# Patient Record
Sex: MALE | Race: WHITE | NOT HISPANIC OR LATINO | URBAN - METROPOLITAN AREA
[De-identification: names, ages, dates, MRNs, and addresses within clinical notes are randomized per-mention and may not be internally consistent; named-entity substitution may affect disease eponyms.]

---

## 2021-02-26 ENCOUNTER — INPATIENT (INPATIENT)
Facility: HOSPITAL | Age: 71
LOS: 9 days | Discharge: HOME | End: 2021-03-08
Attending: STUDENT IN AN ORGANIZED HEALTH CARE EDUCATION/TRAINING PROGRAM | Admitting: STUDENT IN AN ORGANIZED HEALTH CARE EDUCATION/TRAINING PROGRAM
Payer: MEDICARE

## 2021-02-26 VITALS
RESPIRATION RATE: 24 BRPM | TEMPERATURE: 98 F | DIASTOLIC BLOOD PRESSURE: 86 MMHG | HEART RATE: 165 BPM | OXYGEN SATURATION: 86 % | HEIGHT: 73 IN | WEIGHT: 199.96 LBS | SYSTOLIC BLOOD PRESSURE: 144 MMHG

## 2021-02-26 PROCEDURE — 99291 CRITICAL CARE FIRST HOUR: CPT | Mod: CS

## 2021-02-26 NOTE — ED ADULT TRIAGE NOTE - CHIEF COMPLAINT QUOTE
Pt BIBA from home c/o weakness inability to ambulate today, flu like symptoms x1 week, SOB, chills/body aches. Pt tachycardic and hypoxic when EMS arrived and placed pt on O2. Pt labored breathing in triage, O2 Sat 82-88% and tachycardic. Pt denies chest pain. Pt brought direct to crit iso room

## 2021-02-27 LAB
ALBUMIN SERPL ELPH-MCNC: 4.1 G/DL — SIGNIFICANT CHANGE UP (ref 3.5–5.2)
ALP SERPL-CCNC: 64 U/L — SIGNIFICANT CHANGE UP (ref 30–115)
ALT FLD-CCNC: 25 U/L — SIGNIFICANT CHANGE UP (ref 0–41)
ANION GAP SERPL CALC-SCNC: 17 MMOL/L — HIGH (ref 7–14)
APPEARANCE UR: CLEAR — SIGNIFICANT CHANGE UP
APTT BLD: 30.9 SEC — SIGNIFICANT CHANGE UP (ref 27–39.2)
AST SERPL-CCNC: 54 U/L — HIGH (ref 0–41)
BACTERIA # UR AUTO: NEGATIVE — SIGNIFICANT CHANGE UP
BASE EXCESS BLDV CALC-SCNC: -5.9 MMOL/L — LOW (ref -2–2)
BASOPHILS # BLD AUTO: 0.02 K/UL — SIGNIFICANT CHANGE UP (ref 0–0.2)
BASOPHILS NFR BLD AUTO: 0.2 % — SIGNIFICANT CHANGE UP (ref 0–1)
BILIRUB SERPL-MCNC: 0.6 MG/DL — SIGNIFICANT CHANGE UP (ref 0.2–1.2)
BILIRUB UR-MCNC: NEGATIVE — SIGNIFICANT CHANGE UP
BUN SERPL-MCNC: 93 MG/DL — CRITICAL HIGH (ref 10–20)
CA-I SERPL-SCNC: 1.11 MMOL/L — LOW (ref 1.12–1.3)
CALCIUM SERPL-MCNC: 8.8 MG/DL — SIGNIFICANT CHANGE UP (ref 8.5–10.1)
CHLORIDE SERPL-SCNC: 101 MMOL/L — SIGNIFICANT CHANGE UP (ref 98–110)
CK MB CFR SERPL CALC: 2 NG/ML — SIGNIFICANT CHANGE UP (ref 0.6–6.3)
CK SERPL-CCNC: 432 U/L — HIGH (ref 0–225)
CO2 SERPL-SCNC: 19 MMOL/L — SIGNIFICANT CHANGE UP (ref 17–32)
COLOR SPEC: YELLOW — SIGNIFICANT CHANGE UP
CREAT SERPL-MCNC: 2.6 MG/DL — HIGH (ref 0.7–1.5)
CRP SERPL-MCNC: 3.41 MG/DL — HIGH (ref 0–0.4)
D DIMER BLD IA.RAPID-MCNC: 2793 NG/ML DDU — HIGH (ref 0–230)
DIFF PNL FLD: ABNORMAL
EOSINOPHIL # BLD AUTO: 0 K/UL — SIGNIFICANT CHANGE UP (ref 0–0.7)
EOSINOPHIL NFR BLD AUTO: 0 % — SIGNIFICANT CHANGE UP (ref 0–8)
EPI CELLS # UR: 2 /HPF — SIGNIFICANT CHANGE UP (ref 0–5)
ETHANOL SERPL-MCNC: <10 MG/DL — SIGNIFICANT CHANGE UP
FERRITIN SERPL-MCNC: 3177 NG/ML — HIGH (ref 30–400)
GAS PNL BLDV: 142 MMOL/L — SIGNIFICANT CHANGE UP (ref 136–145)
GAS PNL BLDV: SIGNIFICANT CHANGE UP
GAS PNL BLDV: SIGNIFICANT CHANGE UP
GLUCOSE BLDC GLUCOMTR-MCNC: 129 MG/DL — HIGH (ref 70–99)
GLUCOSE BLDC GLUCOMTR-MCNC: 147 MG/DL — HIGH (ref 70–99)
GLUCOSE BLDC GLUCOMTR-MCNC: 149 MG/DL — HIGH (ref 70–99)
GLUCOSE BLDC GLUCOMTR-MCNC: 359 MG/DL — HIGH (ref 70–99)
GLUCOSE SERPL-MCNC: 166 MG/DL — HIGH (ref 70–99)
GLUCOSE UR QL: NEGATIVE — SIGNIFICANT CHANGE UP
HCO3 BLDV-SCNC: 21 MMOL/L — LOW (ref 22–29)
HCT VFR BLD CALC: 42.6 % — SIGNIFICANT CHANGE UP (ref 42–52)
HCT VFR BLDA CALC: 45.3 % — HIGH (ref 34–44)
HGB BLD CALC-MCNC: 14.8 G/DL — SIGNIFICANT CHANGE UP (ref 14–18)
HGB BLD-MCNC: 14.6 G/DL — SIGNIFICANT CHANGE UP (ref 14–18)
HYALINE CASTS # UR AUTO: 4 /LPF — SIGNIFICANT CHANGE UP (ref 0–7)
IMM GRANULOCYTES NFR BLD AUTO: 0.4 % — HIGH (ref 0.1–0.3)
INR BLD: 1.1 RATIO — SIGNIFICANT CHANGE UP (ref 0.65–1.3)
KETONES UR-MCNC: SIGNIFICANT CHANGE UP
LACTATE BLDV-MCNC: 3.3 MMOL/L — HIGH (ref 0.5–1.6)
LEUKOCYTE ESTERASE UR-ACNC: NEGATIVE — SIGNIFICANT CHANGE UP
LYMPHOCYTES # BLD AUTO: 0.78 K/UL — LOW (ref 1.2–3.4)
LYMPHOCYTES # BLD AUTO: 8.4 % — LOW (ref 20.5–51.1)
MCHC RBC-ENTMCNC: 30.4 PG — SIGNIFICANT CHANGE UP (ref 27–31)
MCHC RBC-ENTMCNC: 34.3 G/DL — SIGNIFICANT CHANGE UP (ref 32–37)
MCV RBC AUTO: 88.8 FL — SIGNIFICANT CHANGE UP (ref 80–94)
MONOCYTES # BLD AUTO: 0.39 K/UL — SIGNIFICANT CHANGE UP (ref 0.1–0.6)
MONOCYTES NFR BLD AUTO: 4.2 % — SIGNIFICANT CHANGE UP (ref 1.7–9.3)
NEUTROPHILS # BLD AUTO: 8.02 K/UL — HIGH (ref 1.4–6.5)
NEUTROPHILS NFR BLD AUTO: 86.8 % — HIGH (ref 42.2–75.2)
NITRITE UR-MCNC: NEGATIVE — SIGNIFICANT CHANGE UP
NRBC # BLD: 0 /100 WBCS — SIGNIFICANT CHANGE UP (ref 0–0)
NT-PROBNP SERPL-SCNC: 654 PG/ML — HIGH (ref 0–300)
PCO2 BLDV: 44 MMHG — SIGNIFICANT CHANGE UP (ref 41–51)
PH BLDV: 7.28 — SIGNIFICANT CHANGE UP (ref 7.26–7.43)
PH UR: 6 — SIGNIFICANT CHANGE UP (ref 5–8)
PLATELET # BLD AUTO: 232 K/UL — SIGNIFICANT CHANGE UP (ref 130–400)
PO2 BLDV: 15 MMHG — LOW (ref 20–40)
POTASSIUM BLDV-SCNC: 4.8 MMOL/L — SIGNIFICANT CHANGE UP (ref 3.3–5.6)
POTASSIUM SERPL-MCNC: 4.9 MMOL/L — SIGNIFICANT CHANGE UP (ref 3.5–5)
POTASSIUM SERPL-SCNC: 4.9 MMOL/L — SIGNIFICANT CHANGE UP (ref 3.5–5)
PROCALCITONIN SERPL-MCNC: 0.5 NG/ML — HIGH (ref 0.02–0.1)
PROT SERPL-MCNC: 7.6 G/DL — SIGNIFICANT CHANGE UP (ref 6–8)
PROT UR-MCNC: ABNORMAL
PROTHROM AB SERPL-ACNC: 12.7 SEC — SIGNIFICANT CHANGE UP (ref 9.95–12.87)
RAPID RVP RESULT: DETECTED
RBC # BLD: 4.8 M/UL — SIGNIFICANT CHANGE UP (ref 4.7–6.1)
RBC # FLD: 14.3 % — SIGNIFICANT CHANGE UP (ref 11.5–14.5)
RBC CASTS # UR COMP ASSIST: 2 /HPF — SIGNIFICANT CHANGE UP (ref 0–4)
SAO2 % BLDV: 15 % — SIGNIFICANT CHANGE UP
SARS-COV-2 RNA SPEC QL NAA+PROBE: DETECTED
SODIUM SERPL-SCNC: 137 MMOL/L — SIGNIFICANT CHANGE UP (ref 135–146)
SP GR SPEC: 1.02 — SIGNIFICANT CHANGE UP (ref 1.01–1.03)
TROPONIN T SERPL-MCNC: 0.02 NG/ML — HIGH
TROPONIN T SERPL-MCNC: <0.01 NG/ML — SIGNIFICANT CHANGE UP
UROBILINOGEN FLD QL: SIGNIFICANT CHANGE UP
WBC # BLD: 9.25 K/UL — SIGNIFICANT CHANGE UP (ref 4.8–10.8)
WBC # FLD AUTO: 9.25 K/UL — SIGNIFICANT CHANGE UP (ref 4.8–10.8)
WBC UR QL: 6 /HPF — HIGH (ref 0–5)

## 2021-02-27 PROCEDURE — 70450 CT HEAD/BRAIN W/O DYE: CPT | Mod: 26

## 2021-02-27 PROCEDURE — 93970 EXTREMITY STUDY: CPT | Mod: 26

## 2021-02-27 PROCEDURE — 71045 X-RAY EXAM CHEST 1 VIEW: CPT | Mod: 26

## 2021-02-27 PROCEDURE — 76770 US EXAM ABDO BACK WALL COMP: CPT | Mod: 26

## 2021-02-27 PROCEDURE — 99223 1ST HOSP IP/OBS HIGH 75: CPT | Mod: CS

## 2021-02-27 PROCEDURE — 93010 ELECTROCARDIOGRAM REPORT: CPT

## 2021-02-27 PROCEDURE — 93010 ELECTROCARDIOGRAM REPORT: CPT | Mod: 77

## 2021-02-27 RX ORDER — INSULIN LISPRO 100/ML
VIAL (ML) SUBCUTANEOUS
Refills: 0 | Status: DISCONTINUED | OUTPATIENT
Start: 2021-02-27 | End: 2021-03-08

## 2021-02-27 RX ORDER — INSULIN GLARGINE 100 [IU]/ML
18 INJECTION, SOLUTION SUBCUTANEOUS AT BEDTIME
Refills: 0 | Status: DISCONTINUED | OUTPATIENT
Start: 2021-02-27 | End: 2021-02-27

## 2021-02-27 RX ORDER — DEXTROSE 50 % IN WATER 50 %
25 SYRINGE (ML) INTRAVENOUS ONCE
Refills: 0 | Status: DISCONTINUED | OUTPATIENT
Start: 2021-02-27 | End: 2021-03-08

## 2021-02-27 RX ORDER — SODIUM CHLORIDE 9 MG/ML
1000 INJECTION, SOLUTION INTRAVENOUS
Refills: 0 | Status: DISCONTINUED | OUTPATIENT
Start: 2021-02-27 | End: 2021-03-08

## 2021-02-27 RX ORDER — ACETAMINOPHEN 500 MG
650 TABLET ORAL EVERY 6 HOURS
Refills: 0 | Status: DISCONTINUED | OUTPATIENT
Start: 2021-02-27 | End: 2021-03-08

## 2021-02-27 RX ORDER — PANTOPRAZOLE SODIUM 20 MG/1
40 TABLET, DELAYED RELEASE ORAL
Refills: 0 | Status: DISCONTINUED | OUTPATIENT
Start: 2021-02-27 | End: 2021-03-08

## 2021-02-27 RX ORDER — CEFEPIME 1 G/1
2000 INJECTION, POWDER, FOR SOLUTION INTRAMUSCULAR; INTRAVENOUS EVERY 12 HOURS
Refills: 0 | Status: DISCONTINUED | OUTPATIENT
Start: 2021-02-27 | End: 2021-02-27

## 2021-02-27 RX ORDER — DEXAMETHASONE 0.5 MG/5ML
6 ELIXIR ORAL DAILY
Refills: 0 | Status: DISCONTINUED | OUTPATIENT
Start: 2021-02-27 | End: 2021-03-08

## 2021-02-27 RX ORDER — CEFEPIME 1 G/1
1000 INJECTION, POWDER, FOR SOLUTION INTRAMUSCULAR; INTRAVENOUS ONCE
Refills: 0 | Status: COMPLETED | OUTPATIENT
Start: 2021-02-27 | End: 2021-02-27

## 2021-02-27 RX ORDER — DEXAMETHASONE 0.5 MG/5ML
6 ELIXIR ORAL ONCE
Refills: 0 | Status: COMPLETED | OUTPATIENT
Start: 2021-02-27 | End: 2021-02-27

## 2021-02-27 RX ORDER — GLUCAGON INJECTION, SOLUTION 0.5 MG/.1ML
1 INJECTION, SOLUTION SUBCUTANEOUS ONCE
Refills: 0 | Status: DISCONTINUED | OUTPATIENT
Start: 2021-02-27 | End: 2021-03-08

## 2021-02-27 RX ORDER — DEXTROSE 50 % IN WATER 50 %
15 SYRINGE (ML) INTRAVENOUS ONCE
Refills: 0 | Status: DISCONTINUED | OUTPATIENT
Start: 2021-02-27 | End: 2021-03-08

## 2021-02-27 RX ORDER — INSULIN LISPRO 100/ML
6 VIAL (ML) SUBCUTANEOUS
Refills: 0 | Status: DISCONTINUED | OUTPATIENT
Start: 2021-02-27 | End: 2021-03-08

## 2021-02-27 RX ORDER — SODIUM CHLORIDE 9 MG/ML
2000 INJECTION, SOLUTION INTRAVENOUS ONCE
Refills: 0 | Status: COMPLETED | OUTPATIENT
Start: 2021-02-27 | End: 2021-02-27

## 2021-02-27 RX ORDER — CEFEPIME 1 G/1
INJECTION, POWDER, FOR SOLUTION INTRAMUSCULAR; INTRAVENOUS
Refills: 0 | Status: DISCONTINUED | OUTPATIENT
Start: 2021-02-27 | End: 2021-03-01

## 2021-02-27 RX ORDER — INSULIN GLARGINE 100 [IU]/ML
18 INJECTION, SOLUTION SUBCUTANEOUS AT BEDTIME
Refills: 0 | Status: DISCONTINUED | OUTPATIENT
Start: 2021-02-27 | End: 2021-03-08

## 2021-02-27 RX ORDER — SODIUM CHLORIDE 9 MG/ML
1000 INJECTION, SOLUTION INTRAVENOUS
Refills: 0 | Status: DISCONTINUED | OUTPATIENT
Start: 2021-02-27 | End: 2021-03-02

## 2021-02-27 RX ORDER — HEPARIN SODIUM 5000 [USP'U]/ML
5000 INJECTION INTRAVENOUS; SUBCUTANEOUS EVERY 8 HOURS
Refills: 0 | Status: DISCONTINUED | OUTPATIENT
Start: 2021-02-27 | End: 2021-03-08

## 2021-02-27 RX ORDER — DEXTROSE 50 % IN WATER 50 %
12.5 SYRINGE (ML) INTRAVENOUS ONCE
Refills: 0 | Status: DISCONTINUED | OUTPATIENT
Start: 2021-02-27 | End: 2021-03-08

## 2021-02-27 RX ORDER — VANCOMYCIN HCL 1 G
1000 VIAL (EA) INTRAVENOUS ONCE
Refills: 0 | Status: COMPLETED | OUTPATIENT
Start: 2021-02-27 | End: 2021-02-27

## 2021-02-27 RX ORDER — CEFEPIME 1 G/1
1000 INJECTION, POWDER, FOR SOLUTION INTRAMUSCULAR; INTRAVENOUS EVERY 12 HOURS
Refills: 0 | Status: DISCONTINUED | OUTPATIENT
Start: 2021-02-28 | End: 2021-03-01

## 2021-02-27 RX ADMIN — PANTOPRAZOLE SODIUM 40 MILLIGRAM(S): 20 TABLET, DELAYED RELEASE ORAL at 08:13

## 2021-02-27 RX ADMIN — Medication 6 UNIT(S): at 17:08

## 2021-02-27 RX ADMIN — SODIUM CHLORIDE 60 MILLILITER(S): 9 INJECTION, SOLUTION INTRAVENOUS at 17:41

## 2021-02-27 RX ADMIN — Medication 6 UNIT(S): at 12:04

## 2021-02-27 RX ADMIN — Medication 6 MILLIGRAM(S): at 05:39

## 2021-02-27 RX ADMIN — HEPARIN SODIUM 5000 UNIT(S): 5000 INJECTION INTRAVENOUS; SUBCUTANEOUS at 17:08

## 2021-02-27 RX ADMIN — CEFEPIME 100 MILLIGRAM(S): 1 INJECTION, POWDER, FOR SOLUTION INTRAMUSCULAR; INTRAVENOUS at 16:53

## 2021-02-27 RX ADMIN — INSULIN GLARGINE 18 UNIT(S): 100 INJECTION, SOLUTION SUBCUTANEOUS at 21:12

## 2021-02-27 RX ADMIN — Medication 6 MILLIGRAM(S): at 11:16

## 2021-02-27 RX ADMIN — HEPARIN SODIUM 5000 UNIT(S): 5000 INJECTION INTRAVENOUS; SUBCUTANEOUS at 20:39

## 2021-02-27 RX ADMIN — CEFEPIME 100 MILLIGRAM(S): 1 INJECTION, POWDER, FOR SOLUTION INTRAMUSCULAR; INTRAVENOUS at 00:19

## 2021-02-27 RX ADMIN — Medication 5: at 12:05

## 2021-02-27 RX ADMIN — Medication 250 MILLIGRAM(S): at 01:14

## 2021-02-27 RX ADMIN — Medication 6 UNIT(S): at 08:12

## 2021-02-27 RX ADMIN — SODIUM CHLORIDE 2000 MILLILITER(S): 9 INJECTION, SOLUTION INTRAVENOUS at 00:19

## 2021-02-27 NOTE — ED PROVIDER NOTE - OBJECTIVE STATEMENT
69 y/o M PMHx DM, COPD not on home O2, presents to ED with generalized weakness and SOB x1 week. Pt reports mild substernal chest pain as well. Denies abdominal pain and vomiting. Pt tachycardic, hypoxic and febrile in triage. 71 y/o M PMHx DM, COPD not on home O2, presents to ED with generalized weakness and SOB x1 week. Pt reports mild substernal chest pain as well. Denies abdominal pain and vomiting. Pt tachycardic, hypoxic in high 80s and febrile in triage.

## 2021-02-27 NOTE — ED PROVIDER NOTE - CARE PLAN
Principal Discharge DX:	Weakness  Secondary Diagnosis:	SIMON (acute kidney injury)   Principal Discharge DX:	COVID-19  Secondary Diagnosis:	SIMON (acute kidney injury)   Principal Discharge DX:	COVID-19  Secondary Diagnosis:	SIMON (acute kidney injury)  Secondary Diagnosis:	Elevated troponin  Secondary Diagnosis:	Chest pain

## 2021-02-27 NOTE — ED PROVIDER NOTE - PROGRESS NOTE DETAILS
DL - Per patients wife, pt has been increasingly confused, having worsening tremors and generalized weakness over past week. Also reports pt has had urinary incontinence. No hx of alcohol abuse or Parkinson's dx. Pt s/o to me by Dr. Lala to follow up imaging, labs, reassess and dispo.

## 2021-02-27 NOTE — H&P ADULT - ATTENDING COMMENTS
Patient seen and examined independently. Agree with resident note/ history / physical exam and plan of care with following exceptions/additions/updates. Case discussed with patient/pt decision maker, house-staff and nursing.     above notes edited

## 2021-02-27 NOTE — H&P ADULT - ASSESSMENT
================= ASSESSMENT/PLAN ==================  Patient is a 70y old Male who presents with a chief complaint of Currently admitted to medicine with the primary diagnosis of COVID-19    # Acute hypoxic respiratory failure likely secondary to COVID 19 pneumonia   # Possible underlying RLL pneumonia   # Possible underlying PE  COVID PCR positive   X ray not impressive for COVID 19. Possible pneumonia (RLL opacity? although procal is low and no WBC) vs possible PE (d-dimer 3000)   Sepsis present on admission   PLAN  - Start decadron   - ID evaluation for further treatment options   - Unable to get CTPE given creatinine. Instead STAT duplex LE extremities. Heparin SQ for now   - Will continue cefepime for now renally dosed   - Repeat x ray and procal in AM  - TTE to rule out right heart strain     # SIMON vs CKD   - Send urine studies   - Gentle hydration   - Monitor BMP     # COPD not on home O2 - No evidence of exacerbation   - Get ABG   - Decadron for now   - Continue nebs     # Diabetes - BS uncontrolled   - Start basal bolus insulin   - Give stat lantus  - May need further adjustment with decadron     GI PPX: Pantoprazole   DVT PPX: Heparin SQ    DIET: DASH,carb, renal  ACTIVITY:  Advanced as tolerated     ================= DISPOSITION ==================    Patient to be discharged when condition(s) optimized.            Discharge to: Home when cleared             Home services:              Counseled on/Discussed cessation of:  () Risky behaviors  /  () Smoking cessation  /  () Diet  /  () Exercise  /  () Other    ================= CODE STATUS =================                  (X) FULL CODE     |     () DNR     |     () DNI    () Discussion with patient and/or family regarding goals of care       ================= ASSESSMENT/PLAN ==================  Patient is a 70y old Male who presents with a chief complaint of Currently admitted to medicine with the primary diagnosis of COVID-19    # AMS , worse than baseline, pt has confusion at base, now worse, probably due to hypoxemia and poss viral infection, monitor, consider neuro eval if not imprvoing , tremor is not new.   # Acute hypoxic respiratory failure likely secondary to COVID 19 pneumonia   # Possible underlying RLL pneumonia   # Possible underlying PE  COVID PCR positive   X ray not impressive for COVID 19. Possible pneumonia (RLL opacity? although procal is low and no WBC) vs possible PE (d-dimer 3000)   Sepsis present on admission   PLAN  - Start decadron   - ID evaluation for further treatment options   - Unable to get CTPE given creatinine. Instead STAT duplex LE extremities. Heparin SQ for now   - Will continue cefepime for now renally dosed   - Repeat x ray and procal in AM  - TTE to rule out right heart strain     # SIMON vs CKD , unknown baseline  - Send urine studies   - Gentle hydration   - Monitor BMP   -check bladder and kidney us, if has urinary retention/ or hydronephrosis botello or intermittenet cath     # COPD not on home O2 - No evidence of exacerbation   - Get ABG   - Decadron for now   - Continue nebs     # Diabetes - BS uncontrolled   - Start basal bolus insulin   - Give stat lantus  - May need further adjustment with decadron     GI PPX: Pantoprazole   DVT PPX: Heparin SQ    DIET: DASH,carb, renal  ACTIVITY:  Advanced as tolerated     ================= DISPOSITION ==================    Patient to be discharged when condition(s) optimized.            Discharge to: Home when cleared             Home services:              Counseled on/Discussed cessation of:  () Risky behaviors  /  () Smoking cessation  /  () Diet  /  () Exercise  /  () Other    ================= CODE STATUS =================                  (X) FULL CODE     |     () DNR     |     () DNI    () Discussion with patient and/or family regarding goals of care       ================= ASSESSMENT/PLAN ==================  Patient is a 70y old Male who presents with a chief complaint of Currently admitted to medicine with the primary diagnosis of COVID-19    # AMS , worse than baseline, pt has confusion at base, now worse, probably due to hypoxemia and poss viral infection, monitor, consider neuro eval if not imprvoing , tremor is not new.   # Acute hypoxic respiratory failure likely secondary to COVID 19 pneumonia   # Possible underlying RLL pneumonia   # Possible underlying PE  COVID PCR positive   X ray not impressive for COVID 19. Possible pneumonia (RLL opacity? although procal is low and no WBC) vs possible PE (d-dimer 3000)   Sepsis present on admission   PLAN  - Start decadron   - ID evaluation for further treatment options   - Unable to get CTPE given creatinine. Instead STAT duplex LE extremities. Heparin SQ for now   - Will continue cefepime for now renally dosed   - Repeat x ray and procal in AM  - TTE to rule out right heart strain     # SIMON vs CKD , unknown baseline  Attempted to call Dr. Tiana Ferrer for latest lab results but office currently closed   - Send urine studies   - Gentle hydration   - Monitor BMP   -check bladder and kidney us, if has urinary retention/ or hydronephrosis botello or intermittenet cath   - Hold Enalapril for now     # Elevated troponins - 0.02  Unlikely ACS. Likely due to kidney function   - Trend   - TTE     # COPD not on home O2 - No evidence of exacerbation   Only on albuterol at home. He uses it infrequently   - Get ABG   - Decadron for now   - Continue nebs     # Diabetes - BS uncontrolled   - Start basal bolus insulin   - Give stat lantus  - May need further adjustment with decadron     # Essential tremor   - Outpatient neurology follow up     GI PPX: Pantoprazole   DVT PPX: Heparin SQ    DIET: DASH,carb, renal  ACTIVITY:  Advanced as tolerated     Plan discussed with wife. Questions answered     ================= DISPOSITION ==================    Patient to be discharged when condition(s) optimized.            Discharge to: Home when cleared             Home services:              Counseled on/Discussed cessation of:  () Risky behaviors  /  () Smoking cessation  /  () Diet  /  () Exercise  /  () Other    ================= CODE STATUS =================                  () FULL CODE     |     (X DNR     |     (X DNI    () Discussion with patient and/or family regarding goals of care

## 2021-02-27 NOTE — H&P ADULT - NSHPLABSRESULTS_GEN_ALL_CORE
===================== LABS =====================                        14.6   9.25  )-----------( 232      ( 2021 00:31 )             42.6         137  |  101  |  93<HH>  ----------------------------<  166<H>  4.9   |  19  |  2.6<H>    Ca    8.8      2021 00:31    TPro  7.6  /  Alb  4.1  /  TBili  0.6  /  DBili  x   /  AST  54<H>  /  ALT  25  /  AlkPhos  64      PT/INR - ( 2021 00:31 )   PT: 12.70 sec;   INR: 1.10 ratio         PTT - ( 2021 00:31 )  PTT:30.9 sec  Urinalysis Basic - ( 2021 02:30 )    Color: Yellow / Appearance: Clear / S.021 / pH: x  Gluc: x / Ketone: Trace  / Bili: Negative / Urobili: <2 mg/dL   Blood: x / Protein: 100 mg/dL / Nitrite: Negative   Leuk Esterase: Negative / RBC: 2 /HPF / WBC 6 /HPF   Sq Epi: x / Non Sq Epi: 2 /HPF / Bacteria: Negative        Troponin T, Serum: 0.02 ng/mL <H> (21 @ 00:31)    CARDIAC MARKERS ( 2021 00:31 )  x     / 0.02 ng/mL / x     / x     / x          ================= MICROBIOLOGY ================    ================== IMAGING ==================    ================== OTHER SIGNIFICANT FINDINGS ==================    ================== PROCEDURES ==================    ================== EKG ================== ===================== LABS =====================                        14.6   9.25  )-----------( 232      ( 2021 00:31 )             42.6         137  |  101  |  93<HH>  ----------------------------<  166<H>  4.9   |  19  |  2.6<H>    Ca    8.8      2021 00:31    TPro  7.6  /  Alb  4.1  /  TBili  0.6  /  DBili  x   /  AST  54<H>  /  ALT  25  /  AlkPhos  64      PT/INR - ( 2021 00:31 )   PT: 12.70 sec;   INR: 1.10 ratio         PTT - ( 2021 00:31 )  PTT:30.9 sec  Urinalysis Basic - ( 2021 02:30 )    Color: Yellow / Appearance: Clear / S.021 / pH: x  Gluc: x / Ketone: Trace  / Bili: Negative / Urobili: <2 mg/dL   Blood: x / Protein: 100 mg/dL / Nitrite: Negative   Leuk Esterase: Negative / RBC: 2 /HPF / WBC 6 /HPF   Sq Epi: x / Non Sq Epi: 2 /HPF / Bacteria: Negative    Troponin T, Serum: 0.02 ng/mL <H> (21 @ 00:31)    CARDIAC MARKERS ( 2021 00:31 )  x     / 0.02 ng/mL / x     / x     / x      ================== IMAGING ==================    < from: CT Head No Cont (21 @ 04:58) >    No CT evidence of acute intracranial pathology.    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    ================== EKG ==================    < from: 12 Lead ECG (21 @ 00:21) >    Diagnosis Line Sinus tachycardia with Premature atrialcomplexes episodes of SVT  Possible Inferior infarct , age undetermined  Abnormal ECG

## 2021-02-27 NOTE — ED PROVIDER NOTE - PHYSICAL EXAMINATION
CONSTITUTIONAL: Well-developed; well-nourished; in no acute distress. Satting >95%  on NC. +diffuse tremor   SKIN: warm, dry  HEAD: Normocephalic; atraumatic.  EYES: no conjunctival injection. EOMI.   ENT: Dry MM   NECK: Supple; non tender.  CARD: S1, S2 normal; +tachycardic   RESP: No wheezes, rales or rhonchi.  ABD: soft ntnd.    EXT: Normal ROM. No LE edema.   LYMPH: No acute cervical adenopathy.  NEURO: Alert, oriented, grossly unremarkable. No FND.   PSYCH: Cooperative, appropriate.

## 2021-02-27 NOTE — H&P ADULT - HISTORY OF PRESENT ILLNESS
[70 year old man]    CC: Hypoxia, confusion    PMH: DM, COPD not on home oxygen,     History of Present Illness goes back to 1 week prior to presentation when the patient developed shortness of breath with generalized weakness. His wife also endorsed worsening confusion over the past few days. He also endorsed intermittent occasional chest pain, substernal, nonradiating.      In the ED, BP was 144/86, HR was 165 (sinus), SpO2 was 86% on room air, temp was 101.9.  [70 year old man]    CC: Hypoxia, confusion    PMH: DM, COPD not on home oxygen,     History of Present Illness goes back to 1 week prior to presentation when the patient developed shortness of breath with generalized weakness. The weakness was so severe the patient was unable to get out of bed or walk. He made it alone to he rest room once but legs gave out and the patient fell in the bathtub with head trauma but no syncope. He also was complaining of severe dizziness and lightheadedness.   His wife also endorsed worsening confusion over the past few days. He also endorsed intermittent occasional chest pain, substernal, nonradiating.      The patient is normally fully functional and independent. He does not have confusion at baseline     In the ED, BP was 144/86, HR was 165 (sinus), SpO2 was 86% on room air, temp was 101.9.

## 2021-02-27 NOTE — ED PROVIDER NOTE - CLINICAL SUMMARY MEDICAL DECISION MAKING FREE TEXT BOX
69 yo male presented c/o weakness and SOB, improved with O2. PNA noted on CXR, treated with cefepime and vancomycin. Covid +. + SIMON. pt admitted for further workup and treatment.

## 2021-02-27 NOTE — ED PROVIDER NOTE - ATTENDING CONTRIBUTION TO CARE
70 y M to ED with flu like sx, myalgia, fever and cough  presented to ED with hypotension and hypoxia with tachycardia (CJ=601 on arrival)    No sick contacts and pt alert but confused, d/w wife on the phone as HPI/ROS limited    AVSS, exam as noted, rhonchi, rapid irregular rate, abdomen soft NTND, (+) bowel sounds,     Due to a high probability of clinically significant, life threatening deterioration, the patient required my highest level of preparedness to intervene emergently and I personally spent this critical care time directly and personally managing the patient. This critical care time included obtaining a history; examining the patient; pulse oximetry; ordering and review of studies; arranging urgent treatment with development of a management plan; evaluation of patient's response to treatment; frequent reassessment; and, discussions with other providers and the patient/patients family. This critical care time was performed to assess and manage the high probability of imminent, life-threatening deterioration that could result in multi-organ failure.

## 2021-02-27 NOTE — ED PROVIDER NOTE - NS ED ROS FT
Review of Systems:  CONSTITUTIONAL: No fever, No diaphoresis   SKIN: No rash  HEMATOLOGIC: No abnormal bleeding or bruising  EYES: No eye pain, No blurred vision  ENT: No change in hearing, No sore throat, No neck pain  RESPIRATORY: +shortness of breath, No cough  CARDIAC: +chest pain, No palpitations  GI: No abdominal pain, No nausea, No vomiting, No diarrhea, No constipation, No bright red blood per rectum or melena. No flank pain  : No dysuria, frequency, hematuria.   MUSCULOSKELETAL: No joint paint, No swelling, No back pain  NEUROLOGIC: No numbness, No focal weakness, No headache, No dizziness  All other systems negative, unless specified in HPI

## 2021-02-27 NOTE — ED ADULT NURSE NOTE - OBJECTIVE STATEMENT
pt c/o sob, cough, weakness and inability to walk for past week. pt unable to speak in full sentences. 84% o2 sat on RA, pt placed on 6L NC o2 sat 96%. pt slightly altered- able to state name, , year, but seems to have trouble finding words when asked direct questions

## 2021-02-27 NOTE — H&P ADULT - NSICDXPASTMEDICALHX_GEN_ALL_CORE_FT
PAST MEDICAL HISTORY:  COPD, mild     Diabetes      PAST MEDICAL HISTORY:  COPD, mild     Diabetes     Hypertension

## 2021-02-28 LAB
ANION GAP SERPL CALC-SCNC: 13 MMOL/L — SIGNIFICANT CHANGE UP (ref 7–14)
BASOPHILS # BLD AUTO: 0.01 K/UL — SIGNIFICANT CHANGE UP (ref 0–0.2)
BASOPHILS NFR BLD AUTO: 0.1 % — SIGNIFICANT CHANGE UP (ref 0–1)
BUN SERPL-MCNC: 85 MG/DL — CRITICAL HIGH (ref 10–20)
CALCIUM SERPL-MCNC: 8.3 MG/DL — LOW (ref 8.5–10.1)
CHLORIDE SERPL-SCNC: 109 MMOL/L — SIGNIFICANT CHANGE UP (ref 98–110)
CK MB CFR SERPL CALC: 1.7 NG/ML — SIGNIFICANT CHANGE UP (ref 0.6–6.3)
CK SERPL-CCNC: 259 U/L — HIGH (ref 0–225)
CO2 SERPL-SCNC: 17 MMOL/L — SIGNIFICANT CHANGE UP (ref 17–32)
CREAT ?TM UR-MCNC: 126 MG/DL — SIGNIFICANT CHANGE UP
CREAT SERPL-MCNC: 2.2 MG/DL — HIGH (ref 0.7–1.5)
CULTURE RESULTS: NO GROWTH — SIGNIFICANT CHANGE UP
EOSINOPHIL # BLD AUTO: 0 K/UL — SIGNIFICANT CHANGE UP (ref 0–0.7)
EOSINOPHIL NFR BLD AUTO: 0 % — SIGNIFICANT CHANGE UP (ref 0–8)
GLUCOSE BLDC GLUCOMTR-MCNC: 110 MG/DL — HIGH (ref 70–99)
GLUCOSE BLDC GLUCOMTR-MCNC: 137 MG/DL — HIGH (ref 70–99)
GLUCOSE BLDC GLUCOMTR-MCNC: 138 MG/DL — HIGH (ref 70–99)
GLUCOSE BLDC GLUCOMTR-MCNC: 159 MG/DL — HIGH (ref 70–99)
GLUCOSE SERPL-MCNC: 126 MG/DL — HIGH (ref 70–99)
GRAM STN FLD: SIGNIFICANT CHANGE UP
HCT VFR BLD CALC: 34.5 % — LOW (ref 42–52)
HGB BLD-MCNC: 11.9 G/DL — LOW (ref 14–18)
IMM GRANULOCYTES NFR BLD AUTO: 1.1 % — HIGH (ref 0.1–0.3)
LYMPHOCYTES # BLD AUTO: 0.69 K/UL — LOW (ref 1.2–3.4)
LYMPHOCYTES # BLD AUTO: 6.5 % — LOW (ref 20.5–51.1)
MCHC RBC-ENTMCNC: 30.7 PG — SIGNIFICANT CHANGE UP (ref 27–31)
MCHC RBC-ENTMCNC: 34.5 G/DL — SIGNIFICANT CHANGE UP (ref 32–37)
MCV RBC AUTO: 89.1 FL — SIGNIFICANT CHANGE UP (ref 80–94)
MONOCYTES # BLD AUTO: 0.54 K/UL — SIGNIFICANT CHANGE UP (ref 0.1–0.6)
MONOCYTES NFR BLD AUTO: 5.1 % — SIGNIFICANT CHANGE UP (ref 1.7–9.3)
NEUTROPHILS # BLD AUTO: 9.32 K/UL — HIGH (ref 1.4–6.5)
NEUTROPHILS NFR BLD AUTO: 87.2 % — HIGH (ref 42.2–75.2)
NRBC # BLD: 0 /100 WBCS — SIGNIFICANT CHANGE UP (ref 0–0)
OSMOLALITY UR: 640 MOS/KG — SIGNIFICANT CHANGE UP (ref 50–1200)
PLATELET # BLD AUTO: 218 K/UL — SIGNIFICANT CHANGE UP (ref 130–400)
POTASSIUM SERPL-MCNC: 4.7 MMOL/L — SIGNIFICANT CHANGE UP (ref 3.5–5)
POTASSIUM SERPL-SCNC: 4.7 MMOL/L — SIGNIFICANT CHANGE UP (ref 3.5–5)
RBC # BLD: 3.87 M/UL — LOW (ref 4.7–6.1)
RBC # FLD: 14.5 % — SIGNIFICANT CHANGE UP (ref 11.5–14.5)
SODIUM SERPL-SCNC: 139 MMOL/L — SIGNIFICANT CHANGE UP (ref 135–146)
SODIUM UR-SCNC: 34 MMOL/L — SIGNIFICANT CHANGE UP
SPECIMEN SOURCE: SIGNIFICANT CHANGE UP
TROPONIN T SERPL-MCNC: <0.01 NG/ML — SIGNIFICANT CHANGE UP
URATE UR-MCNC: 41.9 MG/DL — SIGNIFICANT CHANGE UP
UUN UR-MCNC: 1341 MG/DL — SIGNIFICANT CHANGE UP
WBC # BLD: 10.68 K/UL — SIGNIFICANT CHANGE UP (ref 4.8–10.8)
WBC # FLD AUTO: 10.68 K/UL — SIGNIFICANT CHANGE UP (ref 4.8–10.8)

## 2021-02-28 PROCEDURE — 71045 X-RAY EXAM CHEST 1 VIEW: CPT | Mod: 26

## 2021-02-28 PROCEDURE — 99232 SBSQ HOSP IP/OBS MODERATE 35: CPT | Mod: CS

## 2021-02-28 RX ORDER — REMDESIVIR 5 MG/ML
200 INJECTION INTRAVENOUS EVERY 24 HOURS
Refills: 0 | Status: COMPLETED | OUTPATIENT
Start: 2021-02-28 | End: 2021-02-28

## 2021-02-28 RX ORDER — REMDESIVIR 5 MG/ML
INJECTION INTRAVENOUS
Refills: 0 | Status: COMPLETED | OUTPATIENT
Start: 2021-02-28 | End: 2021-03-04

## 2021-02-28 RX ORDER — REMDESIVIR 5 MG/ML
100 INJECTION INTRAVENOUS EVERY 24 HOURS
Refills: 0 | Status: COMPLETED | OUTPATIENT
Start: 2021-03-01 | End: 2021-03-04

## 2021-02-28 RX ADMIN — Medication 6 UNIT(S): at 11:53

## 2021-02-28 RX ADMIN — CEFEPIME 100 MILLIGRAM(S): 1 INJECTION, POWDER, FOR SOLUTION INTRAMUSCULAR; INTRAVENOUS at 04:15

## 2021-02-28 RX ADMIN — INSULIN GLARGINE 18 UNIT(S): 100 INJECTION, SOLUTION SUBCUTANEOUS at 21:19

## 2021-02-28 RX ADMIN — Medication 6 UNIT(S): at 08:04

## 2021-02-28 RX ADMIN — CEFEPIME 100 MILLIGRAM(S): 1 INJECTION, POWDER, FOR SOLUTION INTRAMUSCULAR; INTRAVENOUS at 16:57

## 2021-02-28 RX ADMIN — HEPARIN SODIUM 5000 UNIT(S): 5000 INJECTION INTRAVENOUS; SUBCUTANEOUS at 21:01

## 2021-02-28 RX ADMIN — Medication 6 MILLIGRAM(S): at 04:15

## 2021-02-28 RX ADMIN — Medication 1: at 11:54

## 2021-02-28 RX ADMIN — HEPARIN SODIUM 5000 UNIT(S): 5000 INJECTION INTRAVENOUS; SUBCUTANEOUS at 04:15

## 2021-02-28 RX ADMIN — PANTOPRAZOLE SODIUM 40 MILLIGRAM(S): 20 TABLET, DELAYED RELEASE ORAL at 06:57

## 2021-02-28 RX ADMIN — REMDESIVIR 500 MILLIGRAM(S): 5 INJECTION INTRAVENOUS at 17:30

## 2021-02-28 RX ADMIN — Medication 6 UNIT(S): at 16:57

## 2021-02-28 RX ADMIN — HEPARIN SODIUM 5000 UNIT(S): 5000 INJECTION INTRAVENOUS; SUBCUTANEOUS at 12:28

## 2021-02-28 NOTE — PROGRESS NOTE ADULT - SUBJECTIVE AND OBJECTIVE BOX
F/up of this 71 yo M with PMH copd (not on home oxygen), type 2 DM, HTN, s/p mechanical fall at home with x/o generalized weakness x wks.  Found to be covid-19 + with acute metabolic encephalopathy, acute hypoxic respiratory failure and ARF, likely pre-renal azotemia due to intravascular volume depletion.  + Abnormal cxr suspicious for pneumonic process and markedly elevated inflammatory markers noted.    Currently awake and answering questions appropriately but appears to be tired.  No c/o pain.  Seen eating BK without problems noted.  On NC supplementation.     Vital Signs Last 24 Hrs  T(C): 37.3 (28 Feb 2021 05:21), Max: 37.3 (28 Feb 2021 05:21)  T(F): 99.1 (28 Feb 2021 05:21), Max: 99.1 (28 Feb 2021 05:21)  HR: 91 (28 Feb 2021 05:21) (79 - 99)  BP: 160/77 (28 Feb 2021 05:21) (110/65 - 160/77)  BP(mean): 110 (28 Feb 2021 05:21) (80 - 110)  RR: 18 (28 Feb 2021 09:10) (18 - 20)  SpO2: 92% (28 Feb 2021 09:10) (92% - 97%)    Chest CTA b/l.  Cor Reg S1S2.  Abd not distended, + BS, soft and NT.  LE no swelling, NT.                          11.9   10.68 )-----------( 218      ( 28 Feb 2021 06:25 )             34.5   02-28    139  |  109  |  85<HH>  ----------------------------<  126<H>  4.7   |  17  |  2.2<H>    Ca    8.3<L>      28 Feb 2021 06:25    TPro  7.6  /  Alb  4.1  /  TBili  0.6  /  DBili  x   /  AST  54<H>  /  ALT  25  /  AlkPhos  64  02-27

## 2021-02-28 NOTE — PROGRESS NOTE ADULT - ASSESSMENT
A/P:  Covid-19 sepsis with PNA, acute hypoxic resp failure and SIMON along with acute metabolic encephalopathy - f/up ID recommendations.  Continue IV Cefepime (renally adjusted dosing), f/up PCT in 2 days, continue IV steroid and add IV Remdesivir.  SIMON - IVF rehydration.  F/up electrolytes and renal function.  F/up POCs and adjust insulin dosing prn.  Nutrition support.  DVT prophylaxis.    Fall and aspiration precaution.  Rehab to assess.  SW to assist with dc planning.

## 2021-02-28 NOTE — CONSULT NOTE ADULT - ASSESSMENT
ASSESSMENT  70y M admitted with COVID19 SIMON ELEVATED TROPONIN CHEST PAIN    CC: Hypoxia, confusion    PMH: HTN, DM, COPD not on home oxygen,     History of Present Illness goes back to 1 week prior to presentation when the patient developed shortness of breath with generalized weakness. The weakness was so severe the patient was unable to get out of bed or walk. He made it alone to he rest room once but legs gave out and the patient fell in the bathtub with head trauma but no syncope. He also was complaining of severe dizziness and lightheadedness.   His wife also endorsed worsening confusion over the past few days. He also endorsed intermittent occasional chest pain, substernal, nonradiating.  The patient is normally fully functional and independent. He does not have confusion at baseline. In the ED, BP was 144/86, HR was 165 (sinus), SpO2 was 86% on room air, temp was 101.9.  (27 Feb 2021 13:55)      INFECTIOUS DISEASE HISTORY:  C-Reactive Protein, Serum: 3.41 mg/dL (02-27-21 @ 04:08)  D-Dimer Assay, Quantitative: 2793 ng/mL DDU (02-27-21 @ 04:08)  Ferritin, Serum: 3177 ng/mL (02-27-21 @ 04:08)  Procalcitonin, Serum: 0.50 ng/mL (02-27-21 @ 04:08)        IMPRESSION  #COVID19   Cxray negative  C-Reactive Protein, Serum: 3.41 mg/dL (02-27-21 @ 04:08)  D-Dimer Assay, Quantitative: 2793 ng/mL DDU (02-27-21 @ 04:08)  Ferritin, Serum: 3177 ng/mL (02-27-21 @ 04:08)  Procalcitonin, Serum: 0.50 ng/mL (02-27-21 @ 04:08)    #Sepsis on admission T<96.8F, T>101F, Pulse>90, Resp Rate>20, lactic acidosis, metabolic encephalopathy, SIMON due to COVID.  R/O bacterial infection.  Initial procalcitonin borderline    On cefepime    #Lactic acidosis  #SIMON  #Transaminitis   #DM   #Abx allergy: sulfa      RECOMMENDATIONS  Repeat procalcitonin & Cr  - Continue Dexamethasone 6mg x 10 days or until Discharge (whichever is shorter)  - Remdesivir D1: 200mg x1, Day 2 - Day 5 100mg IV daily. Monitor Cr/LFTs  - Await COVID Ab   - A/C per primary team  - Prone positioning if possible  - Continue Cefepime

## 2021-02-28 NOTE — CONSULT NOTE ADULT - SUBJECTIVE AND OBJECTIVE BOX
KERON HERNANDEZ  70y, Male  Allergy: sulfa drugs (Unknown)      CHIEF COMPLAINT:   Hypoxia, confusion (2021 13:55)      LOS  1d    HPI  HPI:  [70 year old man]    CC: Hypoxia, confusion    PMH: DM, COPD not on home oxygen,     History of Present Illness goes back to 1 week prior to presentation when the patient developed shortness of breath with generalized weakness. The weakness was so severe the patient was unable to get out of bed or walk. He made it alone to he rest room once but legs gave out and the patient fell in the bathtub with head trauma but no syncope. He also was complaining of severe dizziness and lightheadedness.   His wife also endorsed worsening confusion over the past few days. He also endorsed intermittent occasional chest pain, substernal, nonradiating.      The patient is normally fully functional and independent. He does not have confusion at baseline     In the ED, BP was 144/86, HR was 165 (sinus), SpO2 was 86% on room air, temp was 101.9.  (2021 13:55)      INFECTIOUS DISEASE HISTORY:  C-Reactive Protein, Serum: 3.41 mg/dL (21 @ 04:08)  D-Dimer Assay, Quantitative: 2793 ng/mL DDU (21 @ 04:08)  Ferritin, Serum: 3177 ng/mL (21 @ 04:08)  Procalcitonin, Serum: 0.50 ng/mL (21 @ 04:08)      PMH  PAST MEDICAL & SURGICAL HISTORY:  Hypertension    COPD, mild    Diabetes        FAMILY HISTORY      SOCIAL HISTORY  Social History:  Active smoker. 1 pack per day (2021 13:55)        ROS  General: Denies rigors, nightsweats  HEENT: Denies headache, rhinorrhea, sore throat, eye pain  CV: Denies CP, palpitations  PULM: Denies wheezing, hemoptysis  GI: Denies hematemesis, hematochezia, melena  : Denies discharge, hematuria  MSK: Denies arthralgias, myalgias  SKIN: Denies rash, lesions  NEURO: Denies paresthesias, weakness  PSYCH: Denies depression, anxiety    VITALS:  T(F): 98.2, Max: 100.3 (21 @ 08:00)  HR: 79  BP: 130/70  RR: 18Vital Signs Last 24 Hrs  T(C): 36.8 (2021 21:30), Max: 37.9 (2021 08:00)  T(F): 98.2 (2021 21:30), Max: 100.3 (2021 08:00)  HR: 79 (2021 21:30) (79 - 100)  BP: 130/70 (2021 21:30) (110/65 - 137/62)  BP(mean): 80 (2021 13:52) (80 - 91)  RR: 18 (2021 21:30) (18 - 21)  SpO2: 97% (2021 21:30) (96% - 97%)    PHYSICAL EXAM:  ***    TESTS & MEASUREMENTS:                        14.6   9.25  )-----------( 232      ( 2021 00:31 )             42.6         137  |  101  |  93<HH>  ----------------------------<  166<H>  4.9   |  19  |  2.6<H>    Ca    8.8      2021 00:31    TPro  7.6  /  Alb  4.1  /  TBili  0.6  /  DBili  x   /  AST  54<H>  /  ALT  25  /  AlkPhos  64  27      LIVER FUNCTIONS - ( 2021 00:31 )  Alb: 4.1 g/dL / Pro: 7.6 g/dL / ALK PHOS: 64 U/L / ALT: 25 U/L / AST: 54 U/L / GGT: x           Urinalysis Basic - ( 2021 02:30 )    Color: Yellow / Appearance: Clear / S.021 / pH: x  Gluc: x / Ketone: Trace  / Bili: Negative / Urobili: <2 mg/dL   Blood: x / Protein: 100 mg/dL / Nitrite: Negative   Leuk Esterase: Negative / RBC: 2 /HPF / WBC 6 /HPF   Sq Epi: x / Non Sq Epi: 2 /HPF / Bacteria: Negative          Blood Gas Venous - Lactate: 3.3 mmoL/L (21 @ 00:01)      INFECTIOUS DISEASES TESTING  Procalcitonin, Serum: 0.50 ng/mL (21 @ 04:08)  Rapid RVP Result: Detected (21 @ 00:31)      INFLAMMATORY MARKERS  C-Reactive Protein, Serum: 3.41 mg/dL (21 @ 04:08)      RADIOLOGY & ADDITIONAL TESTS:  I have personally reviewed the last Chest xray  CXR      CT      CARDIOLOGY TESTING  12 Lead ECG:   Ventricular Rate 124 BPM    Atrial Rate 124 BPM    P-R Interval 168 ms    QRS Duration 82 ms    Q-T Interval 306 ms    QTC Calculation(Bazett) 439 ms    R Axis 53 degrees    T Axis 6 degrees    Diagnosis Line Sinus tachycardia with Premature atrialcomplexes episodes of SVT  Possible Inferior infarct , age undetermined  Abnormal ECG    Confirmed by SOL GAMEZ MD ([id number]) on 2021 9:23:28 AM (21 @ 00:21)      MEDICATIONS  cefepime   IVPB     cefepime   IVPB 1000 IV Intermittent every 12 hours  dexAMETHasone  Injectable 6 IV Push daily  dextrose 40% Gel 15 Oral once  dextrose 5%. 1000 IV Continuous <Continuous>  dextrose 5%. 1000 IV Continuous <Continuous>  dextrose 50% Injectable 25 IV Push once  dextrose 50% Injectable 12.5 IV Push once  dextrose 50% Injectable 25 IV Push once  glucagon  Injectable 1 IntraMuscular once  heparin   Injectable 5000 SubCutaneous every 8 hours  insulin glargine Injectable (LANTUS) 18 SubCutaneous at bedtime  insulin lispro (ADMELOG) corrective regimen sliding scale  SubCutaneous three times a day before meals  insulin lispro Injectable (ADMELOG) 6 SubCutaneous three times a day before meals  lactated ringers. 1000 IV Continuous <Continuous>  pantoprazole    Tablet 40 Oral before breakfast      Weight  Weight (kg): 90.7 (21 @ 23:35)    ANTIBIOTICS:  cefepime   IVPB      cefepime   IVPB 1000 milliGRAM(s) IV Intermittent every 12 hours      ALLERGIES:  sulfa drugs (Unknown)

## 2021-03-01 LAB
A1C WITH ESTIMATED AVERAGE GLUCOSE RESULT: 6.1 % — HIGH (ref 4–5.6)
ANION GAP SERPL CALC-SCNC: 12 MMOL/L — SIGNIFICANT CHANGE UP (ref 7–14)
ANISOCYTOSIS BLD QL: SLIGHT — SIGNIFICANT CHANGE UP
BASOPHILS # BLD AUTO: 0 K/UL — SIGNIFICANT CHANGE UP (ref 0–0.2)
BASOPHILS NFR BLD AUTO: 0 % — SIGNIFICANT CHANGE UP (ref 0–1)
BUN SERPL-MCNC: 69 MG/DL — CRITICAL HIGH (ref 10–20)
CALCIUM SERPL-MCNC: 8.2 MG/DL — LOW (ref 8.5–10.1)
CHLORIDE SERPL-SCNC: 109 MMOL/L — SIGNIFICANT CHANGE UP (ref 98–110)
CO2 SERPL-SCNC: 20 MMOL/L — SIGNIFICANT CHANGE UP (ref 17–32)
CREAT SERPL-MCNC: 1.9 MG/DL — HIGH (ref 0.7–1.5)
CRP SERPL-MCNC: 28 MG/L — HIGH
EOSINOPHIL # BLD AUTO: 0 K/UL — SIGNIFICANT CHANGE UP (ref 0–0.7)
EOSINOPHIL NFR BLD AUTO: 0 % — SIGNIFICANT CHANGE UP (ref 0–8)
ESTIMATED AVERAGE GLUCOSE: 128 MG/DL — HIGH (ref 68–114)
GIANT PLATELETS BLD QL SMEAR: PRESENT — SIGNIFICANT CHANGE UP
GLUCOSE BLDC GLUCOMTR-MCNC: 113 MG/DL — HIGH (ref 70–99)
GLUCOSE BLDC GLUCOMTR-MCNC: 116 MG/DL — HIGH (ref 70–99)
GLUCOSE BLDC GLUCOMTR-MCNC: 165 MG/DL — HIGH (ref 70–99)
GLUCOSE BLDC GLUCOMTR-MCNC: 95 MG/DL — SIGNIFICANT CHANGE UP (ref 70–99)
GLUCOSE SERPL-MCNC: 84 MG/DL — SIGNIFICANT CHANGE UP (ref 70–99)
HCT VFR BLD CALC: 34.8 % — LOW (ref 42–52)
HCV AB S/CO SERPL IA: 0.03 COI — SIGNIFICANT CHANGE UP
HCV AB SERPL-IMP: SIGNIFICANT CHANGE UP
HGB BLD-MCNC: 11.9 G/DL — LOW (ref 14–18)
LYMPHOCYTES # BLD AUTO: 0.52 K/UL — LOW (ref 1.2–3.4)
LYMPHOCYTES # BLD AUTO: 4.4 % — LOW (ref 20.5–51.1)
MANUAL SMEAR VERIFICATION: SIGNIFICANT CHANGE UP
MCHC RBC-ENTMCNC: 30.5 PG — SIGNIFICANT CHANGE UP (ref 27–31)
MCHC RBC-ENTMCNC: 34.2 G/DL — SIGNIFICANT CHANGE UP (ref 32–37)
MCV RBC AUTO: 89.2 FL — SIGNIFICANT CHANGE UP (ref 80–94)
MICROCYTES BLD QL: SLIGHT — SIGNIFICANT CHANGE UP
MONOCYTES # BLD AUTO: 0.72 K/UL — HIGH (ref 0.1–0.6)
MONOCYTES NFR BLD AUTO: 6.1 % — SIGNIFICANT CHANGE UP (ref 1.7–9.3)
NEUTROPHILS # BLD AUTO: 10.38 K/UL — HIGH (ref 1.4–6.5)
NEUTROPHILS NFR BLD AUTO: 87.8 % — HIGH (ref 42.2–75.2)
OVALOCYTES BLD QL SMEAR: SLIGHT — SIGNIFICANT CHANGE UP
PLAT MORPH BLD: NORMAL — SIGNIFICANT CHANGE UP
PLATELET # BLD AUTO: 240 K/UL — SIGNIFICANT CHANGE UP (ref 130–400)
POLYCHROMASIA BLD QL SMEAR: SLIGHT — SIGNIFICANT CHANGE UP
POTASSIUM SERPL-MCNC: 4.8 MMOL/L — SIGNIFICANT CHANGE UP (ref 3.5–5)
POTASSIUM SERPL-SCNC: 4.8 MMOL/L — SIGNIFICANT CHANGE UP (ref 3.5–5)
PROCALCITONIN SERPL-MCNC: 0.33 NG/ML — HIGH (ref 0.02–0.1)
RBC # BLD: 3.9 M/UL — LOW (ref 4.7–6.1)
RBC # FLD: 14.4 % — SIGNIFICANT CHANGE UP (ref 11.5–14.5)
RBC BLD AUTO: ABNORMAL
SARS-COV-2 IGG SERPL QL IA: POSITIVE
SARS-COV-2 IGM SERPL IA-ACNC: 30.9 INDEX — HIGH
SODIUM SERPL-SCNC: 141 MMOL/L — SIGNIFICANT CHANGE UP (ref 135–146)
VARIANT LYMPHS # BLD: 1.7 % — SIGNIFICANT CHANGE UP (ref 0–5)
WBC # BLD: 11.82 K/UL — HIGH (ref 4.8–10.8)
WBC # FLD AUTO: 11.82 K/UL — HIGH (ref 4.8–10.8)

## 2021-03-01 PROCEDURE — 99233 SBSQ HOSP IP/OBS HIGH 50: CPT | Mod: CS

## 2021-03-01 RX ADMIN — HEPARIN SODIUM 5000 UNIT(S): 5000 INJECTION INTRAVENOUS; SUBCUTANEOUS at 05:23

## 2021-03-01 RX ADMIN — Medication 650 MILLIGRAM(S): at 21:38

## 2021-03-01 RX ADMIN — Medication 1: at 12:38

## 2021-03-01 RX ADMIN — Medication 6 UNIT(S): at 18:47

## 2021-03-01 RX ADMIN — INSULIN GLARGINE 18 UNIT(S): 100 INJECTION, SOLUTION SUBCUTANEOUS at 21:37

## 2021-03-01 RX ADMIN — CEFEPIME 100 MILLIGRAM(S): 1 INJECTION, POWDER, FOR SOLUTION INTRAMUSCULAR; INTRAVENOUS at 05:24

## 2021-03-01 RX ADMIN — Medication 6 UNIT(S): at 12:38

## 2021-03-01 RX ADMIN — SODIUM CHLORIDE 60 MILLILITER(S): 9 INJECTION, SOLUTION INTRAVENOUS at 12:39

## 2021-03-01 RX ADMIN — PANTOPRAZOLE SODIUM 40 MILLIGRAM(S): 20 TABLET, DELAYED RELEASE ORAL at 05:24

## 2021-03-01 RX ADMIN — HEPARIN SODIUM 5000 UNIT(S): 5000 INJECTION INTRAVENOUS; SUBCUTANEOUS at 12:37

## 2021-03-01 RX ADMIN — HEPARIN SODIUM 5000 UNIT(S): 5000 INJECTION INTRAVENOUS; SUBCUTANEOUS at 22:37

## 2021-03-01 RX ADMIN — Medication 6 MILLIGRAM(S): at 05:23

## 2021-03-01 RX ADMIN — REMDESIVIR 500 MILLIGRAM(S): 5 INJECTION INTRAVENOUS at 18:51

## 2021-03-01 NOTE — PHYSICAL THERAPY INITIAL EVALUATION ADULT - PERTINENT HX OF CURRENT PROBLEM, REHAB EVAL
pt is a 71 y/o male admitted for h 1 week prior to presentation when the patient developed shortness of breath with generalized weakness. The weakness was so severe the patient was unable to get out of bed or walk. He made it alone to he rest room once but legs gave out and the patient fell in the bathtub with head trauma but no syncope. He also was complaining of severe dizziness and lightheadedness.

## 2021-03-01 NOTE — PROGRESS NOTE ADULT - ASSESSMENT
ASSESSMENT  70y M HTN, DM, COPD not on home oxygen, admitted with 1 week of shortness of breath with generalized weakness.       IMPRESSION  #GPR in BCX, 1/2 suspect contaminant    2/27 BCX + 1/2  #COVID19 , Severe requiring supplemental O2   Cxray negative    C-Reactive Protein, Serum: 3.41 mg/dL (02-27-21 @ 04:08)    D-Dimer Assay, Quantitative: 2793 ng/mL DDU (02-27-21 @ 04:08)    Ferritin, Serum: 3177 ng/mL (02-27-21 @ 04:08)    Procalcitonin, Serum: 0.50 ng/mL (02-27-21 @ 04:08)  #Sepsis on admission  T>101F, Pulse>90  #Lactic acidosis  #SIMON  #Transaminitis   #DM   #Abx allergy: sulfa      RECOMMENDATIONS  - D/C cefepime, no bacterial PNA  - F/u GPR, suspect contaminant   - IF hemodynamic compromise, dose Vanc 1g x1   - Continue Dexamethasone 6mg x 10 days or until Discharge (whichever is shorter)  - Remdesivir D1: 200mg x1, Day 2 - Day 5 100mg IV daily. Monitor Cr/LFTs  - Await COVID Ab   - A/C per primary team  - Prone positioning if possible    Please call with any questions or send a message on [employer] Teams  Spectra [phone]

## 2021-03-01 NOTE — PROGRESS NOTE ADULT - SUBJECTIVE AND OBJECTIVE BOX
SUBJECTIVE:    Patient is a 70y old Male who presents with a chief complaint of Hypoxia, confusion (01 Mar 2021 10:16)    Currently admitted to medicine with the primary diagnosis of COVID-19.  Today is hospital day 2d. This morning he is resting comfortably in bed and reports no new issues or overnight events. Pt denies SOB, Chest pain, abdominal pain, dysuria, n/v/d/c. Pt is on 3L NC SpO2 95-98%.     PAST MEDICAL & SURGICAL HISTORY  Hypertension    COPD, mild    Diabetes    ALLERGIES:  sulfa drugs (Unknown)    MEDICATIONS:  STANDING MEDICATIONS  dexAMETHasone  Injectable 6 milliGRAM(s) IV Push daily  dextrose 40% Gel 15 Gram(s) Oral once  dextrose 5%. 1000 milliLiter(s) IV Continuous <Continuous>  dextrose 5%. 1000 milliLiter(s) IV Continuous <Continuous>  dextrose 50% Injectable 25 Gram(s) IV Push once  dextrose 50% Injectable 12.5 Gram(s) IV Push once  dextrose 50% Injectable 25 Gram(s) IV Push once  glucagon  Injectable 1 milliGRAM(s) IntraMuscular once  heparin   Injectable 5000 Unit(s) SubCutaneous every 8 hours  insulin glargine Injectable (LANTUS) 18 Unit(s) SubCutaneous at bedtime  insulin lispro (ADMELOG) corrective regimen sliding scale   SubCutaneous three times a day before meals  insulin lispro Injectable (ADMELOG) 6 Unit(s) SubCutaneous three times a day before meals  lactated ringers. 1000 milliLiter(s) IV Continuous <Continuous>  pantoprazole    Tablet 40 milliGRAM(s) Oral before breakfast  remdesivir  IVPB   IV Intermittent   remdesivir  IVPB 100 milliGRAM(s) IV Intermittent every 24 hours    PRN MEDICATIONS  acetaminophen   Tablet .. 650 milliGRAM(s) Oral every 6 hours PRN    VITALS:   T(F): 96.7  HR: 79  BP: 160/71  RR: 20  SpO2: 95%    LABS:                        11.9   11.82 )-----------( 240      ( 01 Mar 2021 07:28 )             34.8     03-01    141  |  109  |  69<HH>  ----------------------------<  84  4.8   |  20  |  1.9<H>    Ca    8.2<L>      01 Mar 2021 07:28                Culture - Urine (collected 27 Feb 2021 02:30)  Source: .Urine Clean Catch (Midstream)  Final Report (28 Feb 2021 08:07):    No growth    Culture - Blood (collected 27 Feb 2021 00:59)  Source: .Blood Blood-Peripheral  Gram Stain (28 Feb 2021 20:33):    Growth in aerobic bottle: Gram Positive Rods  Preliminary Report (28 Feb 2021 22:22):    Growth in aerobic bottle: Gram Positive Rods    **BCID performed. No targets detected.**    ***Blood Panel PCR results on this specimen are available    approximately 3 hours after the Gram stain result.***    Gram stain, PCR, and/or culture results may not always    correspond due to difference in methodologies.    ************************************************************    This PCR assay was performed by multiplex PCR. This    Assay tests for 66 bacterial and resistance gene targets.    Please refer to [facility] [employer] test directory    at [email or web address] for details.    Culture - Blood (collected 27 Feb 2021 00:59)  Source: .Blood Blood-Peripheral  Preliminary Report (28 Feb 2021 07:01):    No growth to date.      CARDIAC MARKERS ( 28 Feb 2021 06:25 )  x     / <0.01 ng/mL / 259 U/L / x     / 1.7 ng/mL  CARDIAC MARKERS ( 27 Feb 2021 17:56 )  x     / <0.01 ng/mL / 432 U/L / x     / 2.0 ng/mL      RADIOLOGY:  < from: Xray Chest 1 View- PORTABLE-Routine (Xray Chest 1 View- PORTABLE-Routine in AM.) (02.28.21 @ 08:26) >  Impression:    No focal consolidation, pleural effusion or pneumothorax.        < end of copied text >  < from: US Kidney and Bladder (02.27.21 @ 15:06) >  IMPRESSION:  1.  Bilateral renal cysts, as described.  2.  No hydronephrosis.  3.  Limited evaluation of the urinary bladder.  3.  Enlarged prostate.      < end of copied text >  < from: CT Head No Cont (02.27.21 @ 04:58) >  Impression:      No CT evidence of acute intracranial pathology.    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      < end of copied text >      PHYSICAL EXAM:  GEN: No acute distress,   LUNGS: Clear to auscultation bilaterally, no wheezes, rales, rhonchi  HEART: Regular rate and rhythm, +s1 s2  ABD: Soft, non-tender, non-distended.   EXT: no LE edema or cyanosis, 2+dorsalis pedes pulses/MERINO/Skin Intact.   NEURO: AAOX3, no focal deficits

## 2021-03-01 NOTE — PHYSICAL THERAPY INITIAL EVALUATION ADULT - GENERAL OBSERVATIONS, REHAB EVAL
10:50-11:20 Pt encountered semifowler in bed in NAD. + IV, tele, O2 at 3lpm. SPO2 on O2 at rest 94%.

## 2021-03-01 NOTE — PHYSICAL THERAPY INITIAL EVALUATION ADULT - GAIT DISTANCE, PT EVAL
3 small side steps to head of bed, pt requested to sit 2* to SOB and fatigue. SPO2 decreased to 87%, improved to 90% once returned to bed with cuing for pursed lip breathing

## 2021-03-01 NOTE — PROGRESS NOTE ADULT - SUBJECTIVE AND OBJECTIVE BOX
MARY, KERON  70y, Male  Allergy: sulfa drugs (Unknown)      LOS  2d    CHIEF COMPLAINT: Hypoxia, confusion (28 Feb 2021 09:35)      INTERVAL EVENTS/HPI  - NC, BCX GPR 1/2 likely contaminant   - T(F): , Max: 98.6 (02-28-21 @ 21:17)  - WBC Count: 11.82 (03-01-21 @ 07:28)  WBC Count: 10.68 (02-28-21 @ 06:25)     - Creatinine, Serum: 1.9 (03-01-21 @ 07:28)  Creatinine, Serum: 2.2 (02-28-21 @ 06:25)           ROS:  9-point ROS performed and negative except as per above    VITALS:  T(F): 96.7, Max: 98.6 (02-28-21 @ 21:17)  HR: 79  BP: 160/71  RR: 20Vital Signs Last 24 Hrs  T(C): 35.9 (01 Mar 2021 05:17), Max: 37 (28 Feb 2021 21:17)  T(F): 96.7 (01 Mar 2021 05:17), Max: 98.6 (28 Feb 2021 21:17)  HR: 79 (01 Mar 2021 05:17) (79 - 89)  BP: 160/71 (01 Mar 2021 05:17) (136/68 - 160/71)  BP(mean): 103 (28 Feb 2021 21:17) (93 - 103)  RR: 20 (01 Mar 2021 05:17) (18 - 20)  SpO2: 95% (01 Mar 2021 05:17) (92% - 98%)    FH: Non-contributory  Social Hx: Non-contributory    TESTS & MEASUREMENTS:                        11.9   11.82 )-----------( 240      ( 01 Mar 2021 07:28 )             34.8     03-01    141  |  109  |  69<HH>  ----------------------------<  84  4.8   |  20  |  1.9<H>    Ca    8.2<L>      01 Mar 2021 07:28      eGFR if Non African American: 35 mL/min/1.73M2 (03-01-21 @ 07:28)  eGFR if African American: 40 mL/min/1.73M2 (03-01-21 @ 07:28)          Culture - Urine (collected 02-27-21 @ 02:30)  Source: .Urine Clean Catch (Midstream)  Final Report (02-28-21 @ 08:07):    No growth    Culture - Blood (collected 02-27-21 @ 00:59)  Source: .Blood Blood-Peripheral  Gram Stain (02-28-21 @ 20:33):    Growth in aerobic bottle: Gram Positive Rods  Preliminary Report (02-28-21 @ 22:22):    Growth in aerobic bottle: Gram Positive Rods    **BCID performed. No targets detected.**    ***Blood Panel PCR results on this specimen are available    approximately 3 hours after the Gram stain result.***    Gram stain, PCR, and/or culture results may not always    correspond due to difference in methodologies.    ************************************************************    This PCR assay was performed by multiplex PCR. This    Assay tests for 66 bacterial and resistance gene targets.    Please refer to [facility] test directory    at [email or web address] for details.    Culture - Blood (collected 02-27-21 @ 00:59)  Source: .Blood Blood-Peripheral  Preliminary Report (02-28-21 @ 07:01):    No growth to date.        Blood Gas Venous - Lactate: 3.3 mmoL/L (02-27-21 @ 00:01)      INFECTIOUS DISEASES TESTING  Procalcitonin, Serum: 0.50 (02-27-21 @ 04:08)  Rapid RVP Result: Detected (02-27-21 @ 00:31)      INFLAMMATORY MARKERS  C-Reactive Protein, Serum: 3.41 mg/dL (02-27-21 @ 04:08)      RADIOLOGY & ADDITIONAL TESTS:  I have personally reviewed the last available Chest xray  CXR      CT      CARDIOLOGY TESTING  12 Lead ECG:   Ventricular Rate 123 BPM    Atrial Rate 156 BPM    P-R Interval 202 ms    QRS Duration 86 ms    Q-T Interval 312 ms    QTC Calculation(Bazett) 446 ms    P Axis 118 degrees    R Axis 36 degrees    T Axis 0 degrees    Diagnosis Line Sinus tachycardiaPremature atrial complexes and svt  Inferior infarct , age undetermined  Abnormal ECG    Confirmed by SERGEY BAH MD ([id number]) on 2/28/2021 10:22:04 PM (02-27-21 @ 01:27)  12 Lead ECG:   Ventricular Rate 124 BPM    Atrial Rate 124 BPM    P-R Interval 168 ms    QRS Duration 82 ms    Q-T Interval 306 ms    QTC Calculation(Bazett) 439 ms    R Axis 53 degrees    T Axis 6 degrees    Diagnosis Line Sinus tachycardia with Premature atrialcomplexes episodes of SVT  Possible Inferior infarct , age undetermined  Abnormal ECG    Confirmed by SOL GAMEZ MD ([id number]) on 2/27/2021 9:23:28 AM (02-27-21 @ 00:21)      MEDICATIONS  cefepime   IVPB     cefepime   IVPB 1000 IV Intermittent every 12 hours  dexAMETHasone  Injectable 6 IV Push daily  dextrose 40% Gel 15 Oral once  dextrose 5%. 1000 IV Continuous <Continuous>  dextrose 5%. 1000 IV Continuous <Continuous>  dextrose 50% Injectable 25 IV Push once  dextrose 50% Injectable 12.5 IV Push once  dextrose 50% Injectable 25 IV Push once  glucagon  Injectable 1 IntraMuscular once  heparin   Injectable 5000 SubCutaneous every 8 hours  insulin glargine Injectable (LANTUS) 18 SubCutaneous at bedtime  insulin lispro (ADMELOG) corrective regimen sliding scale  SubCutaneous three times a day before meals  insulin lispro Injectable (ADMELOG) 6 SubCutaneous three times a day before meals  lactated ringers. 1000 IV Continuous <Continuous>  pantoprazole    Tablet 40 Oral before breakfast  remdesivir  IVPB  IV Intermittent   remdesivir  IVPB 100 IV Intermittent every 24 hours      WEIGHT  Weight (kg): 90.7 (02-26-21 @ 23:35)  Creatinine, Serum: 1.9 mg/dL (03-01-21 @ 07:28)      ANTIBIOTICS:  cefepime   IVPB      cefepime   IVPB 1000 milliGRAM(s) IV Intermittent every 12 hours  remdesivir  IVPB   IV Intermittent   remdesivir  IVPB 100 milliGRAM(s) IV Intermittent every 24 hours      All available historical records have been reviewed

## 2021-03-01 NOTE — PHYSICAL THERAPY INITIAL EVALUATION ADULT - GAIT DEVIATIONS NOTED, PT EVAL
decreased yashira/increased time in double stance/decreased step length/decreased weight-shifting ability

## 2021-03-02 LAB
ALBUMIN SERPL ELPH-MCNC: 3 G/DL — LOW (ref 3.5–5.2)
ALP SERPL-CCNC: 53 U/L — SIGNIFICANT CHANGE UP (ref 30–115)
ALT FLD-CCNC: 26 U/L — SIGNIFICANT CHANGE UP (ref 0–41)
ANION GAP SERPL CALC-SCNC: 10 MMOL/L — SIGNIFICANT CHANGE UP (ref 7–14)
AST SERPL-CCNC: 41 U/L — SIGNIFICANT CHANGE UP (ref 0–41)
BASOPHILS # BLD AUTO: 0.03 K/UL — SIGNIFICANT CHANGE UP (ref 0–0.2)
BASOPHILS NFR BLD AUTO: 0.2 % — SIGNIFICANT CHANGE UP (ref 0–1)
BILIRUB SERPL-MCNC: 0.5 MG/DL — SIGNIFICANT CHANGE UP (ref 0.2–1.2)
BUN SERPL-MCNC: 61 MG/DL — CRITICAL HIGH (ref 10–20)
CALCIUM SERPL-MCNC: 8.3 MG/DL — LOW (ref 8.5–10.1)
CHLORIDE SERPL-SCNC: 108 MMOL/L — SIGNIFICANT CHANGE UP (ref 98–110)
CO2 SERPL-SCNC: 22 MMOL/L — SIGNIFICANT CHANGE UP (ref 17–32)
CREAT SERPL-MCNC: 1.6 MG/DL — HIGH (ref 0.7–1.5)
EOSINOPHIL # BLD AUTO: 0.01 K/UL — SIGNIFICANT CHANGE UP (ref 0–0.7)
EOSINOPHIL NFR BLD AUTO: 0.1 % — SIGNIFICANT CHANGE UP (ref 0–8)
GLUCOSE BLDC GLUCOMTR-MCNC: 106 MG/DL — HIGH (ref 70–99)
GLUCOSE BLDC GLUCOMTR-MCNC: 113 MG/DL — HIGH (ref 70–99)
GLUCOSE BLDC GLUCOMTR-MCNC: 196 MG/DL — HIGH (ref 70–99)
GLUCOSE BLDC GLUCOMTR-MCNC: 94 MG/DL — SIGNIFICANT CHANGE UP (ref 70–99)
GLUCOSE SERPL-MCNC: 97 MG/DL — SIGNIFICANT CHANGE UP (ref 70–99)
HCT VFR BLD CALC: 35.2 % — LOW (ref 42–52)
HGB BLD-MCNC: 12 G/DL — LOW (ref 14–18)
IMM GRANULOCYTES NFR BLD AUTO: 1.3 % — HIGH (ref 0.1–0.3)
LYMPHOCYTES # BLD AUTO: 0.74 K/UL — LOW (ref 1.2–3.4)
LYMPHOCYTES # BLD AUTO: 5.5 % — LOW (ref 20.5–51.1)
MAGNESIUM SERPL-MCNC: 2.2 MG/DL — SIGNIFICANT CHANGE UP (ref 1.8–2.4)
MCHC RBC-ENTMCNC: 30.5 PG — SIGNIFICANT CHANGE UP (ref 27–31)
MCHC RBC-ENTMCNC: 34.1 G/DL — SIGNIFICANT CHANGE UP (ref 32–37)
MCV RBC AUTO: 89.6 FL — SIGNIFICANT CHANGE UP (ref 80–94)
MONOCYTES # BLD AUTO: 0.67 K/UL — HIGH (ref 0.1–0.6)
MONOCYTES NFR BLD AUTO: 5 % — SIGNIFICANT CHANGE UP (ref 1.7–9.3)
NEUTROPHILS # BLD AUTO: 11.81 K/UL — HIGH (ref 1.4–6.5)
NEUTROPHILS NFR BLD AUTO: 87.9 % — HIGH (ref 42.2–75.2)
NRBC # BLD: 0 /100 WBCS — SIGNIFICANT CHANGE UP (ref 0–0)
PLATELET # BLD AUTO: 258 K/UL — SIGNIFICANT CHANGE UP (ref 130–400)
POTASSIUM SERPL-MCNC: 4.8 MMOL/L — SIGNIFICANT CHANGE UP (ref 3.5–5)
POTASSIUM SERPL-SCNC: 4.8 MMOL/L — SIGNIFICANT CHANGE UP (ref 3.5–5)
PROT SERPL-MCNC: 6 G/DL — SIGNIFICANT CHANGE UP (ref 6–8)
RBC # BLD: 3.93 M/UL — LOW (ref 4.7–6.1)
RBC # FLD: 14.3 % — SIGNIFICANT CHANGE UP (ref 11.5–14.5)
SODIUM SERPL-SCNC: 140 MMOL/L — SIGNIFICANT CHANGE UP (ref 135–146)
WBC # BLD: 13.44 K/UL — HIGH (ref 4.8–10.8)
WBC # FLD AUTO: 13.44 K/UL — HIGH (ref 4.8–10.8)

## 2021-03-02 PROCEDURE — 99233 SBSQ HOSP IP/OBS HIGH 50: CPT | Mod: CS

## 2021-03-02 RX ADMIN — Medication 6 UNIT(S): at 16:22

## 2021-03-02 RX ADMIN — HEPARIN SODIUM 5000 UNIT(S): 5000 INJECTION INTRAVENOUS; SUBCUTANEOUS at 13:10

## 2021-03-02 RX ADMIN — Medication 6 UNIT(S): at 07:37

## 2021-03-02 RX ADMIN — INSULIN GLARGINE 18 UNIT(S): 100 INJECTION, SOLUTION SUBCUTANEOUS at 21:25

## 2021-03-02 RX ADMIN — Medication 6 MILLIGRAM(S): at 04:33

## 2021-03-02 RX ADMIN — SODIUM CHLORIDE 60 MILLILITER(S): 9 INJECTION, SOLUTION INTRAVENOUS at 04:46

## 2021-03-02 RX ADMIN — Medication 6 UNIT(S): at 11:53

## 2021-03-02 RX ADMIN — HEPARIN SODIUM 5000 UNIT(S): 5000 INJECTION INTRAVENOUS; SUBCUTANEOUS at 04:34

## 2021-03-02 RX ADMIN — REMDESIVIR 500 MILLIGRAM(S): 5 INJECTION INTRAVENOUS at 16:06

## 2021-03-02 RX ADMIN — Medication 1: at 11:53

## 2021-03-02 RX ADMIN — PANTOPRAZOLE SODIUM 40 MILLIGRAM(S): 20 TABLET, DELAYED RELEASE ORAL at 04:34

## 2021-03-02 RX ADMIN — HEPARIN SODIUM 5000 UNIT(S): 5000 INJECTION INTRAVENOUS; SUBCUTANEOUS at 21:25

## 2021-03-02 NOTE — PROGRESS NOTE ADULT - ASSESSMENT
Patient is a 70y old Male who presents with a chief complaint of  confusion and hypoxia. Currently admitted to medicine with the primary diagnosis of COVID-19.    # Acute hypoxic respiratory failure likely secondary to COVID 19 pneumonia   # COPD not on home O2 - No evidence of exacerbation      no evidence of RLL pneumonia, unlikely PE (Unable to get CTPE given creatinine)  - COVID PCR positive 2/27/21  - X ray (2/28/21): No focal consolidation, pleural effusion or pneumothorax.  - Inflammatory markers: d-dimer 2793, procal 0.5>0.33,  Ferritin 3177, CRP 3.41>28  - Infectious disease recs appreciated  - on room air, SpO2 94-96%  - c/w decadron (started 2/27)  - c/w Remdesivir D1: 200mg x1, Day 2 - Day 5 100mg IV daily. Monitor Cr/LFTs (start 2/28-)  - COVID Ab positive 2/27/21  - duplex LE extremities negative for DVT.   - c/w Heparin SQ  - d/c cefepime as no evidence of Pneumonia  - TTE to rule out right heart strain   - d/c tele  - SpO2 on RA on ambulation 84%, on 3LNC on ambulation  92%, on 3L NC at rest 95%  - C/w PT     # SIMON vs CKD , unknown baseline  - d/c LR  - Monitor BMP   - Cr 2.6-->1.6 today  - bladder and kidney US( 2/27/21):  Bilateral renal cysts, No hydronephrosis.  - Hold Enalapril for now     # Diabetes - BS uncontrolled   - c/w SS,  lantus 18 units, 6 units lispro TID  - adjust as needed ( on decadron)     # Essential tremor   - Outpatient neurology follow up     GI PPX: Pantoprazole   DVT PPX: Heparin SQ  DIET: DASH,carb, renal  ACTIVITY:  Advanced as tolerated   Code status: Full code    [place]    Spoke with pt's wife, updated her about pt's medical status, answered all questions. Wife agreed to [place].

## 2021-03-02 NOTE — PROGRESS NOTE ADULT - SUBJECTIVE AND OBJECTIVE BOX
SUBJECTIVE:    Patient is a 70y old Male who presents with a chief complaint of Hypoxia, confusion (01 Mar 2021 12:36)    Currently admitted to medicine with the primary diagnosis of COVID-19    Today is hospital day 3d. This morning he is resting comfortably in bed and reports no new issues or overnight events. Pt is on RA 94-96% SpO2.    PAST MEDICAL & SURGICAL HISTORY  Hypertension    COPD, mild    Diabetes    ALLERGIES:  sulfa drugs (Unknown)    MEDICATIONS:  STANDING MEDICATIONS  dexAMETHasone  Injectable 6 milliGRAM(s) IV Push daily  dextrose 40% Gel 15 Gram(s) Oral once  dextrose 5%. 1000 milliLiter(s) IV Continuous <Continuous>  dextrose 5%. 1000 milliLiter(s) IV Continuous <Continuous>  dextrose 50% Injectable 25 Gram(s) IV Push once  dextrose 50% Injectable 12.5 Gram(s) IV Push once  dextrose 50% Injectable 25 Gram(s) IV Push once  glucagon  Injectable 1 milliGRAM(s) IntraMuscular once  heparin   Injectable 5000 Unit(s) SubCutaneous every 8 hours  insulin glargine Injectable (LANTUS) 18 Unit(s) SubCutaneous at bedtime  insulin lispro (ADMELOG) corrective regimen sliding scale   SubCutaneous three times a day before meals  insulin lispro Injectable (ADMELOG) 6 Unit(s) SubCutaneous three times a day before meals  pantoprazole    Tablet 40 milliGRAM(s) Oral before breakfast  remdesivir  IVPB   IV Intermittent   remdesivir  IVPB 100 milliGRAM(s) IV Intermittent every 24 hours    PRN MEDICATIONS  acetaminophen   Tablet .. 650 milliGRAM(s) Oral every 6 hours PRN    VITALS:   T(F): 96.9  HR: 74  BP: 154/73  RR: 20  SpO2: 95%    LABS:                        12.0   13.44 )-----------( 258      ( 02 Mar 2021 08:06 )             35.2     03-02    140  |  108  |  61<HH>  ----------------------------<  97  4.8   |  22  |  1.6<H>    Ca    8.3<L>      02 Mar 2021 08:06  Mg     2.2     03-02    TPro  6.0  /  Alb  3.0<L>  /  TBili  0.5  /  DBili  x   /  AST  41  /  ALT  26  /  AlkPhos  53  03-02      RADIOLOGY:  < from: Xray Chest 1 View- PORTABLE-Routine (Xray Chest 1 View- PORTABLE-Routine in AM.) (02.28.21 @ 08:26) >  Impression:    No focal consolidation, pleural effusion or pneumothorax.        < end of copied text >  < from: US Kidney and Bladder (02.27.21 @ 15:06) >  IMPRESSION:  1.  Bilateral renal cysts, as described.  2.  No hydronephrosis.  3.  Limited evaluation of the urinary bladder.  3.  Enlarged prostate.      < end of copied text >  < from: CT Head No Cont (02.27.21 @ 04:58) >  Impression:      No CT evidence of acute intracranial pathology.    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      < end of copied text >      PHYSICAL EXAM:  GEN: No acute distress,   LUNGS: Clear to auscultation bilaterally, no wheezes, rales, rhonchi  HEART: Regular rate and rhythm, +s1 s2  ABD: Soft, non-tender, non-distended.   EXT: no LE edema or cyanosis, 2+dorsalis pedes pulses. Skin Intact.   NEURO: AAOX3, no focal deficits

## 2021-03-03 LAB
GLUCOSE BLDC GLUCOMTR-MCNC: 108 MG/DL — HIGH (ref 70–99)
GLUCOSE BLDC GLUCOMTR-MCNC: 190 MG/DL — HIGH (ref 70–99)
GLUCOSE BLDC GLUCOMTR-MCNC: 85 MG/DL — SIGNIFICANT CHANGE UP (ref 70–99)
GLUCOSE BLDC GLUCOMTR-MCNC: 91 MG/DL — SIGNIFICANT CHANGE UP (ref 70–99)

## 2021-03-03 PROCEDURE — 99233 SBSQ HOSP IP/OBS HIGH 50: CPT | Mod: CS

## 2021-03-03 RX ADMIN — PANTOPRAZOLE SODIUM 40 MILLIGRAM(S): 20 TABLET, DELAYED RELEASE ORAL at 09:04

## 2021-03-03 RX ADMIN — Medication 1: at 12:20

## 2021-03-03 RX ADMIN — Medication 0: at 08:38

## 2021-03-03 RX ADMIN — HEPARIN SODIUM 5000 UNIT(S): 5000 INJECTION INTRAVENOUS; SUBCUTANEOUS at 12:24

## 2021-03-03 RX ADMIN — Medication 0: at 17:19

## 2021-03-03 RX ADMIN — Medication 650 MILLIGRAM(S): at 21:58

## 2021-03-03 RX ADMIN — HEPARIN SODIUM 5000 UNIT(S): 5000 INJECTION INTRAVENOUS; SUBCUTANEOUS at 05:31

## 2021-03-03 RX ADMIN — Medication 6 MILLIGRAM(S): at 05:31

## 2021-03-03 RX ADMIN — REMDESIVIR 500 MILLIGRAM(S): 5 INJECTION INTRAVENOUS at 17:21

## 2021-03-03 RX ADMIN — HEPARIN SODIUM 5000 UNIT(S): 5000 INJECTION INTRAVENOUS; SUBCUTANEOUS at 21:52

## 2021-03-03 RX ADMIN — Medication 6 UNIT(S): at 17:21

## 2021-03-03 RX ADMIN — Medication 6 UNIT(S): at 09:10

## 2021-03-03 RX ADMIN — Medication 6 UNIT(S): at 12:20

## 2021-03-03 NOTE — PROGRESS NOTE ADULT - ASSESSMENT
Patient is a 70y old Male who presents with a chief complaint of  confusion and hypoxia. Currently admitted to medicine with the primary diagnosis of COVID-19.    # Acute hypoxic respiratory failure likely secondary to COVID 19 pneumonia   # COPD not on home O2 - No evidence of exacerbation      no evidence of RLL pneumonia, unlikely PE (Unable to get CTPE given creatinine)  - COVID PCR positive 2/27/21  - X ray (2/28/21): No focal consolidation, pleural effusion or pneumothorax.  - Inflammatory markers: d-dimer 2793, procal 0.5>0.33,  Ferritin 3177, CRP 3.41>28  - Infectious disease recs appreciated  - on room air, SpO2 94-96%  - c/w decadron (started 2/27)  - c/w Remdesivir D1: 200mg x1, Day 2 - Day 5 100mg IV daily. Monitor Cr/LFTs (start 2/28-)  - COVID Ab positive 2/27/21  - duplex LE extremities negative for DVT.   - c/w Heparin SQ  - d/c cefepime as no evidence of Pneumonia  - TTE to rule out right heart strain   - d/c tele  - 92%, on 5L NC   - C/w PT     # SIMON vs CKD , unknown baseline  - d/c LR  - Monitor BMP   - Cr 2.6-->1.6 today  - bladder and kidney US( 2/27/21):  Bilateral renal cysts, No hydronephrosis.  - Hold Enalapril for now     # Diabetes - BS uncontrolled   - c/w SS,  lantus 18 units, 6 units lispro TID  - adjust as needed ( on decadron)     # Essential tremor   - Outpatient neurology follow up     GI PPX: Pantoprazole   DVT PPX: Heparin SQ  DIET: DASH,carb, renal  ACTIVITY:  Advanced as tolerated   Code status: Full code    Dispo: Home with O2

## 2021-03-03 NOTE — PROGRESS NOTE ADULT - SUBJECTIVE AND OBJECTIVE BOX
Name: KERON HERNANDEZ  Age: 70y  Gender: Male        HOD  Pt was seen and examined at bedside. No acute events overnight. 91% 5l      Allergies:  sulfa drugs (Unknown)      PHYSICAL EXAM:    Vitals:  Vital Signs Last 24 Hrs  T(C): 36.3 (03 Mar 2021 08:42), Max: 36.9 (03 Mar 2021 05:00)  T(F): 97.4 (03 Mar 2021 08:42), Max: 98.4 (03 Mar 2021 05:00)  HR: 85 (03 Mar 2021 08:42) (78 - 85)  BP: 161/79 (03 Mar 2021 08:42) (132/63 - 167/77)  BP(mean): --  RR: 18 (03 Mar 2021 08:42) (18 - 20)  SpO2: 95% (03 Mar 2021 08:42) (95% - 96%)    GENERAL: NAD  CHEST/LUNG: CTAB  HEART: S1,S2 RRR  ABDOMEN: Soft, NT/ND, +BS  EXTREMITIES:  2+ DP, no LE edema      LABS:                        12.0   13.44 )-----------( 258      ( 02 Mar 2021 08:06 )             35.2     03-02    140  |  108  |  61<HH>  ----------------------------<  97  4.8   |  22  |  1.6<H>    Ca    8.3<L>      02 Mar 2021 08:06  Mg     2.2     03-02    TPro  6.0  /  Alb  3.0<L>  /  TBili  0.5  /  DBili  x   /  AST  41  /  ALT  26  /  AlkPhos  53  03-02    LIVER FUNCTIONS - ( 02 Mar 2021 08:06 )  Alb: 3.0 g/dL / Pro: 6.0 g/dL / ALK PHOS: 53 U/L / ALT: 26 U/L / AST: 41 U/L / GGT: x                 MEDICATIONS  (STANDING):  dexAMETHasone  Injectable 6 milliGRAM(s) IV Push daily  dextrose 40% Gel 15 Gram(s) Oral once  dextrose 5%. 1000 milliLiter(s) (50 mL/Hr) IV Continuous <Continuous>  dextrose 5%. 1000 milliLiter(s) (100 mL/Hr) IV Continuous <Continuous>  dextrose 50% Injectable 25 Gram(s) IV Push once  dextrose 50% Injectable 12.5 Gram(s) IV Push once  dextrose 50% Injectable 25 Gram(s) IV Push once  glucagon  Injectable 1 milliGRAM(s) IntraMuscular once  heparin   Injectable 5000 Unit(s) SubCutaneous every 8 hours  insulin glargine Injectable (LANTUS) 18 Unit(s) SubCutaneous at bedtime  insulin lispro (ADMELOG) corrective regimen sliding scale   SubCutaneous three times a day before meals  insulin lispro Injectable (ADMELOG) 6 Unit(s) SubCutaneous three times a day before meals  pantoprazole    Tablet 40 milliGRAM(s) Oral before breakfast  remdesivir  IVPB   IV Intermittent   remdesivir  IVPB 100 milliGRAM(s) IV Intermittent every 24 hours        RADIOLOGY & ADDITIONAL TESTS:    Imaging Personally Reviewed:  [x] YES  [ ] NO

## 2021-03-04 ENCOUNTER — TRANSCRIPTION ENCOUNTER (OUTPATIENT)
Age: 71
End: 2021-03-04

## 2021-03-04 LAB
CULTURE RESULTS: SIGNIFICANT CHANGE UP
GLUCOSE BLDC GLUCOMTR-MCNC: 150 MG/DL — HIGH (ref 70–99)
GLUCOSE BLDC GLUCOMTR-MCNC: 253 MG/DL — HIGH (ref 70–99)
GLUCOSE BLDC GLUCOMTR-MCNC: 75 MG/DL — SIGNIFICANT CHANGE UP (ref 70–99)
GLUCOSE BLDC GLUCOMTR-MCNC: 99 MG/DL — SIGNIFICANT CHANGE UP (ref 70–99)
SPECIMEN SOURCE: SIGNIFICANT CHANGE UP

## 2021-03-04 PROCEDURE — 99239 HOSP IP/OBS DSCHRG MGMT >30: CPT | Mod: CS

## 2021-03-04 RX ADMIN — Medication 3: at 12:34

## 2021-03-04 RX ADMIN — Medication 6 UNIT(S): at 17:17

## 2021-03-04 RX ADMIN — Medication 6 UNIT(S): at 12:34

## 2021-03-04 RX ADMIN — Medication 6 MILLIGRAM(S): at 06:01

## 2021-03-04 RX ADMIN — HEPARIN SODIUM 5000 UNIT(S): 5000 INJECTION INTRAVENOUS; SUBCUTANEOUS at 11:37

## 2021-03-04 RX ADMIN — HEPARIN SODIUM 5000 UNIT(S): 5000 INJECTION INTRAVENOUS; SUBCUTANEOUS at 21:25

## 2021-03-04 RX ADMIN — HEPARIN SODIUM 5000 UNIT(S): 5000 INJECTION INTRAVENOUS; SUBCUTANEOUS at 06:01

## 2021-03-04 RX ADMIN — REMDESIVIR 500 MILLIGRAM(S): 5 INJECTION INTRAVENOUS at 11:36

## 2021-03-04 RX ADMIN — PANTOPRAZOLE SODIUM 40 MILLIGRAM(S): 20 TABLET, DELAYED RELEASE ORAL at 06:01

## 2021-03-04 NOTE — PROGRESS NOTE ADULT - SUBJECTIVE AND OBJECTIVE BOX
Name: KERON HERNANDEZ  Age: 70y  Gender: Male        HOD 5  Pt was seen and examined. No overnight events.      Allergies:  sulfa drugs (Unknown)      PHYSICAL EXAM:    Vitals:  Vital Signs Last 24 Hrs  T(C): 36.3 (04 Mar 2021 07:41), Max: 36.7 (03 Mar 2021 15:07)  T(F): 97.3 (04 Mar 2021 07:41), Max: 98 (03 Mar 2021 15:07)  HR: 81 (04 Mar 2021 07:41) (79 - 87)  BP: 145/72 (04 Mar 2021 07:41) (137/74 - 161/79)  BP(mean): --  RR: 18 (04 Mar 2021 07:41) (17 - 19)  SpO2: 92% (04 Mar 2021 07:41) (92% - 95%)    GENERAL: NAD  CHEST/LUNG: CTAB  HEART: S1,S2 RRR  ABDOMEN: Soft, NT/ND, +BS  EXTREMITIES:  2+ DP, no LE edema      LABS:                  MEDICATIONS  (STANDING):  dexAMETHasone  Injectable 6 milliGRAM(s) IV Push daily  dextrose 40% Gel 15 Gram(s) Oral once  dextrose 5%. 1000 milliLiter(s) (50 mL/Hr) IV Continuous <Continuous>  dextrose 5%. 1000 milliLiter(s) (100 mL/Hr) IV Continuous <Continuous>  dextrose 50% Injectable 25 Gram(s) IV Push once  dextrose 50% Injectable 12.5 Gram(s) IV Push once  dextrose 50% Injectable 25 Gram(s) IV Push once  glucagon  Injectable 1 milliGRAM(s) IntraMuscular once  heparin   Injectable 5000 Unit(s) SubCutaneous every 8 hours  insulin glargine Injectable (LANTUS) 18 Unit(s) SubCutaneous at bedtime  insulin lispro (ADMELOG) corrective regimen sliding scale   SubCutaneous three times a day before meals  insulin lispro Injectable (ADMELOG) 6 Unit(s) SubCutaneous three times a day before meals  pantoprazole    Tablet 40 milliGRAM(s) Oral before breakfast  remdesivir  IVPB   IV Intermittent   remdesivir  IVPB 100 milliGRAM(s) IV Intermittent every 24 hours        RADIOLOGY & ADDITIONAL TESTS:    Imaging Personally Reviewed:  [x] YES  [ ] NO Name: KERON HERNANDEZ  Age: 70y  Gender: Male    HOD 5  Pt was seen and examined. No overnight events.      Allergies:  sulfa drugs (Unknown)      PHYSICAL EXAM:    Vitals:  Vital Signs Last 24 Hrs  T(C): 36.3 (04 Mar 2021 07:41), Max: 36.7 (03 Mar 2021 15:07)  T(F): 97.3 (04 Mar 2021 07:41), Max: 98 (03 Mar 2021 15:07)  HR: 81 (04 Mar 2021 07:41) (79 - 87)  BP: 145/72 (04 Mar 2021 07:41) (137/74 - 161/79)  BP(mean): --  RR: 18 (04 Mar 2021 07:41) (17 - 19)  SpO2: 92% (04 Mar 2021 07:41) (92% - 95%)    GENERAL: NAD  CHEST/LUNG: CTAB  HEART: S1,S2 RRR  ABDOMEN: Soft, NT/ND, +BS  EXTREMITIES:  2+ DP, no LE edema      LABS:                  MEDICATIONS  (STANDING):  dexAMETHasone  Injectable 6 milliGRAM(s) IV Push daily  dextrose 40% Gel 15 Gram(s) Oral once  dextrose 5%. 1000 milliLiter(s) (50 mL/Hr) IV Continuous <Continuous>  dextrose 5%. 1000 milliLiter(s) (100 mL/Hr) IV Continuous <Continuous>  dextrose 50% Injectable 25 Gram(s) IV Push once  dextrose 50% Injectable 12.5 Gram(s) IV Push once  dextrose 50% Injectable 25 Gram(s) IV Push once  glucagon  Injectable 1 milliGRAM(s) IntraMuscular once  heparin   Injectable 5000 Unit(s) SubCutaneous every 8 hours  insulin glargine Injectable (LANTUS) 18 Unit(s) SubCutaneous at bedtime  insulin lispro (ADMELOG) corrective regimen sliding scale   SubCutaneous three times a day before meals  insulin lispro Injectable (ADMELOG) 6 Unit(s) SubCutaneous three times a day before meals  pantoprazole    Tablet 40 milliGRAM(s) Oral before breakfast  remdesivir  IVPB   IV Intermittent   remdesivir  IVPB 100 milliGRAM(s) IV Intermittent every 24 hours        RADIOLOGY & ADDITIONAL TESTS:    Imaging Personally Reviewed:  [x] YES  [ ] NO

## 2021-03-04 NOTE — DISCHARGE NOTE PROVIDER - NSDCCPCAREPLAN_GEN_ALL_CORE_FT
PRINCIPAL DISCHARGE DIAGNOSIS  Diagnosis: COVID-19  Assessment and Plan of Treatment:       SECONDARY DISCHARGE DIAGNOSES  Diagnosis: Chest pain  Assessment and Plan of Treatment:     Diagnosis: Elevated troponin  Assessment and Plan of Treatment:     Diagnosis: SIMON (acute kidney injury)  Assessment and Plan of Treatment:      PRINCIPAL DISCHARGE DIAGNOSIS  Diagnosis: COVID-19  Assessment and Plan of Treatment: cont with home o2, monitor with pulse oxy. given at discharge      SECONDARY DISCHARGE DIAGNOSES  Diagnosis: Chest pain  Assessment and Plan of Treatment: resolved    Diagnosis: Elevated troponin  Assessment and Plan of Treatment: resolved    Diagnosis: SIMON (acute kidney injury)  Assessment and Plan of Treatment:

## 2021-03-04 NOTE — DISCHARGE NOTE PROVIDER - HOSPITAL COURSE
Pt is a 69 yo M with PMHx of COPD not on home O2 who presented with confusion/hypoxia, was found to be COVID positive. Pt completed course of decadron, remdesivir and was treated with cefepime for presumed superimposed bacterial pneumonia. Pt had SIMON on CKD, ultrasound bladder/kidney showed B/L renal cysts without hydronephrosis, creatinine improved during hospitalization. Pt being discharged home on O2. 70yoM with a past medical history of COPD (not on home o2), DM2, HTN; presented to the ED on 2/27/21 with flu like symptoms, confusion, and hypoxia, patient reported to having symptoms for 1 week prior to presentation. Found to be COVID positive on 2/27. Patient admitted on 2/27 for further workup and treatment of PNA SIMON, and COVID. Patient received course of cefepime and vancomycin for presumed superimposed bacterial pneumonia and course of remdesivir and dexamethasone to help treat COVID symptoms. Patient had SIMON on CKD, ultrasound bladder/kidney showed B/L renal cysts without hydronephrosis, creatinine improved during hospitalization with IVF.   On hospital day 4 (3/3/21) patient was transferred to [facility]. At [facility] patient was titrated down from 5L nasal canula to 2L nasal canula.   Patient will be discharged home with home oxygen. Patient's wife, Billie, confirmed home O2 arrival; patient will be transported home by EMS.

## 2021-03-04 NOTE — DISCHARGE NOTE PROVIDER - NSDCMRMEDTOKEN_GEN_ALL_CORE_FT
atorvastatin 40 mg oral tablet: 1 tab(s) orally once a day  enalapril 5 mg oral tablet: 1 tab(s) orally once a day  Januvia 100 mg oral tablet: 1 tab(s) orally once a day  metFORMIN 1000 mg oral tablet, extended release: 1 tab(s) orally once a day

## 2021-03-04 NOTE — PROGRESS NOTE ADULT - ASSESSMENT
Patient is a 70y old Male who presents with a chief complaint of  confusion and hypoxia. Currently admitted to medicine with the primary diagnosis of COVID-19.    # Acute hypoxic respiratory failure likely secondary to COVID 19 pneumonia   # COPD not on home O2 - No evidence of exacerbation      no evidence of RLL pneumonia, unlikely PE (Unable to get CTPE given creatinine)  - COVID PCR positive 2/27/21  - X ray (2/28/21): No focal consolidation, pleural effusion or pneumothorax.  - Inflammatory markers: d-dimer 2793, procal 0.5>0.33,  Ferritin 3177, CRP 3.41>28  - Infectious disease recs appreciated  - c/w decadron (started 2/27)  - c/w Remdesivir D1: 200mg x1, Day 2 - Day 5 100mg IV daily. Monitor Cr/LFTs (start 2/28-)  - COVID Ab positive 2/27/21  - duplex LE extremities negative for DVT.   - c/w Heparin SQ  - d/c cefepime as no evidence of Pneumonia  - TTE to rule out right heart strain   - d/c tele  - 95%, on 5L NC   - C/w PT     # SIMON vs CKD , unknown baseline  - d/c LR  - Monitor BMP   - Cr 2.6-->1.6 today  - bladder and kidney US( 2/27/21):  Bilateral renal cysts, No hydronephrosis.  - Hold Enalapril for now     # Diabetes - BS uncontrolled   - c/w SS,  lantus 18 units, 6 units lispro TID  - adjust as needed ( on decadron)     # Essential tremor   - Outpatient neurology follow up     GI PPX: Pantoprazole   DVT PPX: Heparin SQ  DIET: DASH,carb, renal  ACTIVITY:  Advanced as tolerated   Code status: Full code    Dispo: Home with O2   Patient is a 70y old Male who presents with a chief complaint of  confusion and hypoxia. Currently admitted to medicine with the primary diagnosis of COVID-19.    # Acute hypoxic respiratory failure likely secondary to COVID 19 pneumonia   # COPD not on home O2 - No evidence of exacerbation      no evidence of RLL pneumonia, unlikely PE (Unable to get CTPE given creatinine)  - c/w decadron (started 2/27)  - c/w Remdesivir D1: 200mg x1, Day 2 - Day 5 100mg IV daily. Monitor Cr/LFTs (start 2/28-)  - Consider TTE to rule out right heart strain   - 95%, on 5L NC   - C/w PT     # SIMON vs CKD , unknown baseline  - Monitor BMP   - Hold Enalapril for now   - renal dosing of medications    # Diabetes - BS uncontrolled   - c/w SS,  lantus 18 units, 6 units lispro TID  - adjust as needed ( on decadron)     # Essential tremor   - Outpatient neurology follow up     GI PPX: Pantoprazole   DVT PPX: Heparin SQ  DIET: DASH,carb, renal  ACTIVITY:  Advanced as tolerated   Code status: Full code    Dispo: Home with O2

## 2021-03-04 NOTE — DISCHARGE NOTE PROVIDER - CARE PROVIDER_API CALL
Tiana Ferrer J  INTERNAL MEDICINE  [facility] - [address] [place] [address]  Phone: [phone]  Fax: [phone]  Follow Up Time: 1 week

## 2021-03-05 LAB
GLUCOSE BLDC GLUCOMTR-MCNC: 115 MG/DL — HIGH (ref 70–99)
GLUCOSE BLDC GLUCOMTR-MCNC: 135 MG/DL — HIGH (ref 70–99)
GLUCOSE BLDC GLUCOMTR-MCNC: 169 MG/DL — HIGH (ref 70–99)
GLUCOSE BLDC GLUCOMTR-MCNC: 210 MG/DL — HIGH (ref 70–99)
GLUCOSE BLDC GLUCOMTR-MCNC: 232 MG/DL — HIGH (ref 70–99)

## 2021-03-05 RX ADMIN — Medication 6 MILLIGRAM(S): at 05:49

## 2021-03-05 RX ADMIN — Medication 1: at 17:20

## 2021-03-05 RX ADMIN — Medication 2: at 12:39

## 2021-03-05 RX ADMIN — Medication 650 MILLIGRAM(S): at 19:08

## 2021-03-05 RX ADMIN — HEPARIN SODIUM 5000 UNIT(S): 5000 INJECTION INTRAVENOUS; SUBCUTANEOUS at 05:49

## 2021-03-05 RX ADMIN — PANTOPRAZOLE SODIUM 40 MILLIGRAM(S): 20 TABLET, DELAYED RELEASE ORAL at 05:49

## 2021-03-05 RX ADMIN — HEPARIN SODIUM 5000 UNIT(S): 5000 INJECTION INTRAVENOUS; SUBCUTANEOUS at 13:06

## 2021-03-05 RX ADMIN — Medication 6 UNIT(S): at 08:17

## 2021-03-05 RX ADMIN — INSULIN GLARGINE 18 UNIT(S): 100 INJECTION, SOLUTION SUBCUTANEOUS at 20:50

## 2021-03-05 RX ADMIN — HEPARIN SODIUM 5000 UNIT(S): 5000 INJECTION INTRAVENOUS; SUBCUTANEOUS at 20:50

## 2021-03-05 RX ADMIN — Medication 6 UNIT(S): at 17:20

## 2021-03-05 RX ADMIN — Medication 6 UNIT(S): at 12:39

## 2021-03-05 NOTE — PROGRESS NOTE ADULT - SUBJECTIVE AND OBJECTIVE BOX
Name: KERON HERNANDEZ  Age: 70y  Gender: Male        HOD  Pt was seen and examined at bedside. Patient doing well. No acute events overnight. Patient desating on ambulation low 80's. 91%2l    Allergies:  sulfa drugs (Unknown)      PHYSICAL EXAM:    Vitals:  Vital Signs Last 24 Hrs  T(C): 36.4 (05 Mar 2021 07:43), Max: 36.8 (04 Mar 2021 15:56)  T(F): 97.5 (05 Mar 2021 07:43), Max: 98.3 (04 Mar 2021 15:56)  HR: 74 (05 Mar 2021 07:43) (68 - 87)  BP: 129/73 (05 Mar 2021 07:43) (129/73 - 166/80)  BP(mean): --  RR: 19 (05 Mar 2021 07:43) (18 - 19)  SpO2: 94% (05 Mar 2021 07:43) (90% - 95%)    GENERAL: NAD  CHEST/LUNG: CTAB  HEART: S1,S2 RRR  ABDOMEN: Soft, NT/ND, +BS  EXTREMITIES:  2+ DP, no LE edema      LABS:                  MEDICATIONS  (STANDING):  dexAMETHasone  Injectable 6 milliGRAM(s) IV Push daily  dextrose 40% Gel 15 Gram(s) Oral once  dextrose 5%. 1000 milliLiter(s) (50 mL/Hr) IV Continuous <Continuous>  dextrose 5%. 1000 milliLiter(s) (100 mL/Hr) IV Continuous <Continuous>  dextrose 50% Injectable 25 Gram(s) IV Push once  dextrose 50% Injectable 12.5 Gram(s) IV Push once  dextrose 50% Injectable 25 Gram(s) IV Push once  glucagon  Injectable 1 milliGRAM(s) IntraMuscular once  heparin   Injectable 5000 Unit(s) SubCutaneous every 8 hours  insulin glargine Injectable (LANTUS) 18 Unit(s) SubCutaneous at bedtime  insulin lispro (ADMELOG) corrective regimen sliding scale   SubCutaneous three times a day before meals  insulin lispro Injectable (ADMELOG) 6 Unit(s) SubCutaneous three times a day before meals  pantoprazole    Tablet 40 milliGRAM(s) Oral before breakfast        RADIOLOGY & ADDITIONAL TESTS:    Imaging Personally Reviewed:  [x] YES  [ ] NO

## 2021-03-05 NOTE — OCCUPATIONAL THERAPY INITIAL EVALUATION ADULT - GENERAL OBSERVATIONS, REHAB EVAL
Pt initially encountered semi ward in bed in NAD, + IV locked, + 3LPM O2 SpO2 >90%, Pt agreeable to OT assessment, may be seen as confirmed with RN. Pt left in bed at 8:20a,. At 9:00 pt encountered seated on toilet in bathroom, + IV locked, SpO2 77% on RA. Pt provided with O2 via NC 3LPM  SpO2 increased to 91% following 5 min seated rest. Pt returned to semi ward in bed in NAD, + IV locked, + O2 via NC, + bed alarm. RN aware

## 2021-03-05 NOTE — PROGRESS NOTE ADULT - ASSESSMENT
Patient is a 70y old Male who presents with a chief complaint of  confusion and hypoxia. Currently admitted to medicine with the primary diagnosis of COVID-19.    # Acute hypoxic respiratory failure likely secondary to COVID 19 pneumonia   # COPD not on home O2 - No evidence of exacerbation      no evidence of RLL pneumonia, unlikely PE (Unable to get CTPE given creatinine)  - c/w decadron (started 2/27)  - c/w Remdesivir D1: 200mg x1, Day 2 - Day 5 100mg IV daily. Monitor Cr/LFTs (start 2/28-)  - Consider TTE to rule out right heart strain   - 95%, on 5L NC   - C/w PT     # SIMON vs CKD , unknown baseline  - Monitor BMP   - Hold Enalapril for now   - renal dosing of medications    # Diabetes - BS uncontrolled   - c/w SS,  lantus 18 units, 6 units lispro TID  - adjust as needed ( on decadron)     # Essential tremor   - Outpatient neurology follow up     GI PPX: Pantoprazole   DVT PPX: Heparin SQ  DIET: DASH,carb, renal  ACTIVITY:  Advanced as tolerated   Code status: Full code    Dispo: Home with O2. start O2 home delivery script done      Patient is a 70y old Male who presents with a chief complaint of  confusion and hypoxia. Currently admitted to medicine with the primary diagnosis of COVID-19.    # Acute hypoxic respiratory failure likely secondary to COVID 19 pneumonia   # COPD not on home O2 - No evidence of exacerbation      no evidence of RLL pneumonia, unlikely PE (Unable to get CTPE given creatinine)  - c/w decadron (started 2/27)  - c/w Remdesivir D1: 200mg x1, Day 2 - Day 5 100mg IV daily. Monitor Cr/LFTs (start 2/28-)  - Consider TTE to rule out right heart strain   -- Supplemental oxygen as needed  - C/w PT     # SIMON vs CKD , unknown baseline  - Monitor BMP   - Hold Enalapril for now   - renal dosing of medications    # Diabetes - FS glucose uncontrolled   - c/w SS,  lantus 18 units, 6 units lispro TID  - adjust as needed ( on decadron)     # Essential tremor   - Outpatient neurology follow up     GI PPX: Pantoprazole   DVT PPX: Heparin SQ  DIET: DASH,carb, renal  ACTIVITY:  Advanced as tolerated   Code status: Full code    Dispo: Home with O2

## 2021-03-05 NOTE — OCCUPATIONAL THERAPY INITIAL EVALUATION ADULT - TRANSFER TRAINING, PT EVAL
Pt will increase bed <-> chair and toilet transfers to Modified independence by discharge with DME PRN

## 2021-03-05 NOTE — OCCUPATIONAL THERAPY INITIAL EVALUATION ADULT - ADDITIONAL COMMENTS
Pt lives in house with spouse and 2 sons, 5STE, then everything on 1 level, + shower stall, +driving, + large dog

## 2021-03-06 LAB
CULTURE RESULTS: SIGNIFICANT CHANGE UP
GLUCOSE BLDC GLUCOMTR-MCNC: 136 MG/DL — HIGH (ref 70–99)
GLUCOSE BLDC GLUCOMTR-MCNC: 143 MG/DL — HIGH (ref 70–99)
GLUCOSE BLDC GLUCOMTR-MCNC: 295 MG/DL — HIGH (ref 70–99)
GLUCOSE BLDC GLUCOMTR-MCNC: 63 MG/DL — LOW (ref 70–99)
GLUCOSE BLDC GLUCOMTR-MCNC: 90 MG/DL — SIGNIFICANT CHANGE UP (ref 70–99)
SPECIMEN SOURCE: SIGNIFICANT CHANGE UP

## 2021-03-06 PROCEDURE — 99232 SBSQ HOSP IP/OBS MODERATE 35: CPT | Mod: CS

## 2021-03-06 RX ADMIN — Medication 6 UNIT(S): at 12:46

## 2021-03-06 RX ADMIN — HEPARIN SODIUM 5000 UNIT(S): 5000 INJECTION INTRAVENOUS; SUBCUTANEOUS at 21:47

## 2021-03-06 RX ADMIN — Medication 6 MILLIGRAM(S): at 05:47

## 2021-03-06 RX ADMIN — Medication 6 UNIT(S): at 08:41

## 2021-03-06 RX ADMIN — HEPARIN SODIUM 5000 UNIT(S): 5000 INJECTION INTRAVENOUS; SUBCUTANEOUS at 05:47

## 2021-03-06 RX ADMIN — PANTOPRAZOLE SODIUM 40 MILLIGRAM(S): 20 TABLET, DELAYED RELEASE ORAL at 05:49

## 2021-03-06 RX ADMIN — Medication 6 UNIT(S): at 17:56

## 2021-03-06 RX ADMIN — HEPARIN SODIUM 5000 UNIT(S): 5000 INJECTION INTRAVENOUS; SUBCUTANEOUS at 12:51

## 2021-03-06 RX ADMIN — Medication 3: at 12:47

## 2021-03-06 NOTE — PROGRESS NOTE ADULT - ASSESSMENT
Patient is a 70y old Male who presents with a chief complaint of  confusion and hypoxia. Currently admitted to medicine with the primary diagnosis of COVID-19.    # Acute hypoxic respiratory failure likely secondary to COVID 19 pneumonia   # COPD not on home O2 - No evidence of exacerbation      no evidence of RLL pneumonia, unlikely PE (Unable to get CTPE given creatinine)  - c/w decadron (started 2/27)  - c/w Remdesivir D1: 200mg x1, Day 2 - Day 5 100mg IV daily. Monitor Cr/LFTs (start 2/28-)  - Consider TTE to rule out right heart strain   - 95%, on 5L NC   - C/w PT     # SIMON vs CKD , unknown baseline  - Monitor BMP   - Hold Enalapril for now   - renal dosing of medications    # Diabetes - BS uncontrolled   - c/w SS,  lantus 18 units, 6 units lispro TID  - adjust as needed ( on decadron)     # Essential tremor   - Outpatient neurology follow up     GI PPX: Pantoprazole   DVT PPX: Heparin SQ  DIET: DASH,carb, renal  ACTIVITY:  Advanced as tolerated   Code status: Full code    Dispo: Home with O2. start O2 home delivery script done      Patient is a 70y old Male who presents with a chief complaint of  confusion and hypoxia. Currently admitted to medicine with the primary diagnosis of COVID-19.    # Acute hypoxic respiratory failure likely secondary to COVID 19 pneumonia   # COPD not on home O2 - No evidence of exacerbation      no evidence of RLL pneumonia, unlikely PE (Unable to get CTPE given creatinine)  - c/w decadron (started 2/27)  - c/w Remdesivir D1: 200mg x1, Day 2 - Day 5 100mg IV daily. Monitor Cr/LFTs (start 2/28-)  - Consider TTE to rule out right heart strain   - 95%, on 5L NC, continue to wean  - C/w PT     # SIMON vs CKD , unknown baseline  - Monitor BMP   - Hold Enalapril for now   - renal dosing of medications    # Diabetes - BS uncontrolled   - c/w SS,  lantus 18 units, 6 units lispro TID  - adjust as needed ( on decadron)     # Essential tremor   - Outpatient neurology follow up     GI PPX: Pantoprazole   DVT PPX: Heparin SQ  DIET: DASH,carb, renal  ACTIVITY:  Advanced as tolerated   Code status: Full code    Dispo: Home with O2. start O2 home delivery script done

## 2021-03-07 LAB
GLUCOSE BLDC GLUCOMTR-MCNC: 120 MG/DL — HIGH (ref 70–99)
GLUCOSE BLDC GLUCOMTR-MCNC: 137 MG/DL — HIGH (ref 70–99)
GLUCOSE BLDC GLUCOMTR-MCNC: 151 MG/DL — HIGH (ref 70–99)
GLUCOSE BLDC GLUCOMTR-MCNC: 352 MG/DL — HIGH (ref 70–99)

## 2021-03-07 PROCEDURE — 99232 SBSQ HOSP IP/OBS MODERATE 35: CPT | Mod: CS

## 2021-03-07 RX ADMIN — HEPARIN SODIUM 5000 UNIT(S): 5000 INJECTION INTRAVENOUS; SUBCUTANEOUS at 21:41

## 2021-03-07 RX ADMIN — PANTOPRAZOLE SODIUM 40 MILLIGRAM(S): 20 TABLET, DELAYED RELEASE ORAL at 06:01

## 2021-03-07 RX ADMIN — INSULIN GLARGINE 18 UNIT(S): 100 INJECTION, SOLUTION SUBCUTANEOUS at 21:46

## 2021-03-07 RX ADMIN — Medication 6 UNIT(S): at 08:53

## 2021-03-07 RX ADMIN — HEPARIN SODIUM 5000 UNIT(S): 5000 INJECTION INTRAVENOUS; SUBCUTANEOUS at 05:47

## 2021-03-07 RX ADMIN — Medication 1: at 08:53

## 2021-03-07 RX ADMIN — Medication 6 MILLIGRAM(S): at 05:41

## 2021-03-07 RX ADMIN — Medication 5: at 12:54

## 2021-03-07 RX ADMIN — Medication 6 UNIT(S): at 12:54

## 2021-03-07 RX ADMIN — Medication 6 UNIT(S): at 17:43

## 2021-03-07 RX ADMIN — HEPARIN SODIUM 5000 UNIT(S): 5000 INJECTION INTRAVENOUS; SUBCUTANEOUS at 12:55

## 2021-03-07 NOTE — DIETITIAN INITIAL EVALUATION ADULT. - NAME AND PHONE
Nutrition Intervention:meals and snacks,medical food supplements; Nutrition Monitoring:diet order,energy intake,body composition,NFPF, glucose/renal profile

## 2021-03-07 NOTE — DIETITIAN INITIAL EVALUATION ADULT. - ADD RECOMMEND
1. Add Glucerna (vanilla) BID. 2. d/c renal rest as pt not on HD/PD - renal labs noted. 3. cont w/ DASH and CHO consistent diet modifiers. pt @ risk, RD to f/u in 3 days.

## 2021-03-07 NOTE — DIETITIAN INITIAL EVALUATION ADULT. - OTHER CALCULATIONS
Using ABW 90.7kg; 2070-2243kcal (MSJ 1.2-1.3 AF); protein: 91-100g/kg (1.0-1.1g/kg -- renal labs noted - improved, will adjust PRN); Fluid: 1mL/kcal or LIP

## 2021-03-07 NOTE — DIETITIAN INITIAL EVALUATION ADULT. - PERTINENT LABORATORY DATA
(3/2) BUN: 61, Cr: 1.6, eGFr: 43  HgbA!c: 6.1   CAPILLARY BLOOD GLUCOSE      POCT Blood Glucose.: 151 mg/dL (07 Mar 2021 08:23)  POCT Blood Glucose.: 90 mg/dL (06 Mar 2021 22:26)  POCT Blood Glucose.: 63 mg/dL (06 Mar 2021 21:34)  POCT Blood Glucose.: 143 mg/dL (06 Mar 2021 17:02)  POCT Blood Glucose.: 295 mg/dL (06 Mar 2021 12:44)

## 2021-03-07 NOTE — DIETITIAN INITIAL EVALUATION ADULT. - ORAL INTAKE PTA/DIET HISTORY
unable to speak to pt as no room + no answer on personal phone. spoke w/ spouse Billie -- reported po/appetite <50% a week before admit. however, before that he never had any issues w/ his appetite, always ate well. UBW: 84kg vs. wt at admit: 90.7kg -- no wt loss reported per wife. NKFA. No MVI. no cul/rel or personal food rest/prefs.

## 2021-03-07 NOTE — DIETITIAN INITIAL EVALUATION ADULT. - OTHER INFO
pertinent medical information:   # Acute hypoxic respiratory failure likely secondary to COVID 19 pneumonia   # SIMON vs CKD , unknown baseline  # Diabetes - BS uncontrolled   #h/o HTN noted     pertinent subjective information: wife reports that at admit, his appetite is good, however, he doesn't like the food. wife reports bringing him food from home. discussed nutrition supplements in case wife unable to drop off food, so pt will have an alternative to meal -- agreeable to add as appropriate. no chewing/swallowing diffuclty reported.

## 2021-03-07 NOTE — DIETITIAN INITIAL EVALUATION ADULT. - PERTINENT MEDS FT
MEDICATIONS  (STANDING):  dexAMETHasone  Injectable 6 milliGRAM(s) IV Push daily  dextrose 40% Gel 15 Gram(s) Oral once  dextrose 5%. 1000 milliLiter(s) (50 mL/Hr) IV Continuous <Continuous>  dextrose 50% Injectable 25 Gram(s) IV Push once  glucagon  Injectable 1 milliGRAM(s) IntraMuscular once  heparin   Injectable 5000 Unit(s) SubCutaneous every 8 hours  insulin glargine Injectable (LANTUS) 18 Unit(s) SubCutaneous at bedtime  insulin lispro (ADMELOG) corrective regimen sliding scale   SubCutaneous three times a day before meals  pantoprazole    Tablet 40 milliGRAM(s) Oral before breakfast

## 2021-03-07 NOTE — PROGRESS NOTE ADULT - ASSESSMENT
Assessment and Plan:   · Assessment	  Patient is a 70y old Male who presents with a chief complaint of  confusion and hypoxia. Currently admitted to medicine with the primary diagnosis of COVID-19.    # Acute hypoxic respiratory failure likely secondary to COVID 19 pneumonia   # COPD not on home O2 - No evidence of exacerbation      no evidence of RLL pneumonia, unlikely PE (Unable to get CTPE given creatinine)  - c/w decadron (started 2/27)  - c/w Remdesivir D1: 200mg x1, Day 2 - Day 5 100mg IV daily. Monitor Cr/LFTs (2/28-3/4)  - 95%, on 2L NC, continue to wean  - C/w PT     # SIMON vs CKD , unknown baseline  - Monitor BMP   - Hold Enalapril for now   - renal dosing of medications    # Diabetes - BS uncontrolled   - c/w SS,  lantus 18 units, 6 units lispro TID    # Essential tremor   - Outpatient neurology follow up     GI PPX: Pantoprazole   DVT PPX: Heparin SQ  DIET: DASH,carb, renal  ACTIVITY:  Advanced as tolerated   Code status: Full code    Dispo: Home with O2. start O2 home delivery script done

## 2021-03-07 NOTE — DIETITIAN INITIAL EVALUATION ADULT. - PERSON TAUGHT/METHOD
DM + heart healthy NCM handouts emailed -- [email or web address]/patient instructed

## 2021-03-07 NOTE — PROGRESS NOTE ADULT - SUBJECTIVE AND OBJECTIVE BOX
Name: KERON HERNANDEZ  Age: 70y  Gender: Male        HOD  Pt was seen and examined. Resting comfortably.      Allergies:  sulfa drugs (Unknown)      PHYSICAL EXAM:    Vitals:  Vital Signs Last 24 Hrs  T(C): 36.5 (07 Mar 2021 08:33), Max: 36.8 (06 Mar 2021 23:24)  T(F): 97.7 (07 Mar 2021 08:33), Max: 98.3 (06 Mar 2021 23:24)  HR: 66 (07 Mar 2021 08:33) (62 - 66)  BP: 137/86 (07 Mar 2021 08:33) (137/86 - 152/79)  BP(mean): --  RR: 19 (07 Mar 2021 08:33) (19 - 19)  SpO2: 94% (07 Mar 2021 08:33) (94% - 95%)    GENERAL: NAD  CHEST/LUNG: CTAB  HEART: S1,S2 RRR  ABDOMEN: Soft, NT/ND, +BS  EXTREMITIES:  2+ DP, no LE edema      LABS:                  MEDICATIONS  (STANDING):  dexAMETHasone  Injectable 6 milliGRAM(s) IV Push daily  dextrose 40% Gel 15 Gram(s) Oral once  dextrose 5%. 1000 milliLiter(s) (50 mL/Hr) IV Continuous <Continuous>  dextrose 5%. 1000 milliLiter(s) (100 mL/Hr) IV Continuous <Continuous>  dextrose 50% Injectable 25 Gram(s) IV Push once  dextrose 50% Injectable 12.5 Gram(s) IV Push once  dextrose 50% Injectable 25 Gram(s) IV Push once  glucagon  Injectable 1 milliGRAM(s) IntraMuscular once  heparin   Injectable 5000 Unit(s) SubCutaneous every 8 hours  insulin glargine Injectable (LANTUS) 18 Unit(s) SubCutaneous at bedtime  insulin lispro (ADMELOG) corrective regimen sliding scale   SubCutaneous three times a day before meals  insulin lispro Injectable (ADMELOG) 6 Unit(s) SubCutaneous three times a day before meals  pantoprazole    Tablet 40 milliGRAM(s) Oral before breakfast        RADIOLOGY & ADDITIONAL TESTS:    Imaging Personally Reviewed:  [x] YES  [ ] NO

## 2021-03-08 ENCOUNTER — TRANSCRIPTION ENCOUNTER (OUTPATIENT)
Age: 71
End: 2021-03-08

## 2021-03-08 VITALS
TEMPERATURE: 98 F | DIASTOLIC BLOOD PRESSURE: 74 MMHG | RESPIRATION RATE: 18 BRPM | SYSTOLIC BLOOD PRESSURE: 133 MMHG | HEART RATE: 74 BPM | OXYGEN SATURATION: 96 %

## 2021-03-08 LAB
GLUCOSE BLDC GLUCOMTR-MCNC: 215 MG/DL — HIGH (ref 70–99)
GLUCOSE BLDC GLUCOMTR-MCNC: 227 MG/DL — HIGH (ref 70–99)
GLUCOSE BLDC GLUCOMTR-MCNC: 93 MG/DL — SIGNIFICANT CHANGE UP (ref 70–99)

## 2021-03-08 RX ORDER — ATORVASTATIN CALCIUM 80 MG/1
1 TABLET, FILM COATED ORAL
Qty: 0 | Refills: 0 | DISCHARGE

## 2021-03-08 RX ORDER — RIVAROXABAN 15 MG-20MG
1 KIT ORAL
Qty: 30 | Refills: 0
Start: 2021-03-08 | End: 2021-04-06

## 2021-03-08 RX ORDER — SITAGLIPTIN 50 MG/1
1 TABLET, FILM COATED ORAL
Qty: 30 | Refills: 0
Start: 2021-03-08 | End: 2021-04-06

## 2021-03-08 RX ORDER — ATORVASTATIN CALCIUM 80 MG/1
1 TABLET, FILM COATED ORAL
Qty: 30 | Refills: 0
Start: 2021-03-08 | End: 2021-04-06

## 2021-03-08 RX ORDER — SITAGLIPTIN 50 MG/1
1 TABLET, FILM COATED ORAL
Qty: 0 | Refills: 0 | DISCHARGE

## 2021-03-08 RX ORDER — METFORMIN HYDROCHLORIDE 850 MG/1
1 TABLET ORAL
Qty: 30 | Refills: 0
Start: 2021-03-08 | End: 2021-04-06

## 2021-03-08 RX ORDER — METFORMIN HYDROCHLORIDE 850 MG/1
1 TABLET ORAL
Qty: 0 | Refills: 0 | DISCHARGE

## 2021-03-08 RX ADMIN — HEPARIN SODIUM 5000 UNIT(S): 5000 INJECTION INTRAVENOUS; SUBCUTANEOUS at 06:30

## 2021-03-08 RX ADMIN — PANTOPRAZOLE SODIUM 40 MILLIGRAM(S): 20 TABLET, DELAYED RELEASE ORAL at 06:29

## 2021-03-08 RX ADMIN — Medication 6 MILLIGRAM(S): at 06:30

## 2021-03-08 RX ADMIN — Medication 6 UNIT(S): at 12:23

## 2021-03-08 RX ADMIN — Medication 2: at 12:23

## 2021-03-08 NOTE — PROGRESS NOTE ADULT - SUBJECTIVE AND OBJECTIVE BOX
PATIENT:  KERON HERNANDEZ  [id number]    CHIEF COMPLAINT:  Patient is a 70y old  Male who presents with a chief complaint of Hypoxia, confusion (07 Mar 2021 11:44)      INTERVAL HISTORYOVERNIGHT EVENTS:  No complaints. No overnight events. Will go home today.        MEDICATIONS:  MEDICATIONS  (STANDING):  dexAMETHasone  Injectable 6 milliGRAM(s) IV Push daily  dextrose 40% Gel 15 Gram(s) Oral once  dextrose 5%. 1000 milliLiter(s) (50 mL/Hr) IV Continuous <Continuous>  dextrose 5%. 1000 milliLiter(s) (100 mL/Hr) IV Continuous <Continuous>  dextrose 50% Injectable 25 Gram(s) IV Push once  dextrose 50% Injectable 12.5 Gram(s) IV Push once  dextrose 50% Injectable 25 Gram(s) IV Push once  glucagon  Injectable 1 milliGRAM(s) IntraMuscular once  heparin   Injectable 5000 Unit(s) SubCutaneous every 8 hours  insulin glargine Injectable (LANTUS) 18 Unit(s) SubCutaneous at bedtime  insulin lispro (ADMELOG) corrective regimen sliding scale   SubCutaneous three times a day before meals  insulin lispro Injectable (ADMELOG) 6 Unit(s) SubCutaneous three times a day before meals  pantoprazole    Tablet 40 milliGRAM(s) Oral before breakfast    MEDICATIONS  (PRN):  acetaminophen   Tablet .. 650 milliGRAM(s) Oral every 6 hours PRN Temp greater or equal to 38C (100.4F), Moderate Pain (4 - 6)      ALLERGIES:  Allergies    sulfa drugs (Unknown)    Intolerances        OBJECTIVE:  T(C): 36.4 (08 Mar 2021 05:58), Max: 36.5 (07 Mar 2021 23:48)  T(F): 97.6 (08 Mar 2021 05:58), Max: 97.7 (07 Mar 2021 23:48)  HR: 71 (08 Mar 2021 05:58) (63 - 76)  BP: 138/79 (08 Mar 2021 05:58) (136/74 - 168/77)  RR: 18 (08 Mar 2021 05:58) (18 - 18)  SpO2: 95% (08 Mar 2021 05:58) (94% - 96%)      CAPILLARY BLOOD GLUCOSE      POCT Blood Glucose.: 215 mg/dL (08 Mar 2021 11:57)  POCT Blood Glucose.: 93 mg/dL (08 Mar 2021 07:58)  POCT Blood Glucose.: 120 mg/dL (07 Mar 2021 21:23)  POCT Blood Glucose.: 137 mg/dL (07 Mar 2021 16:47)    CAPILLARY BLOOD GLUCOSE      POCT Blood Glucose.: 215 mg/dL (08 Mar 2021 11:57)      PHYSICAL EXAMINATION:  General: WN/WD NAD  HEENT: PERRLA, EOMI, moist mucous membranes  Neurology: A&Ox3, nonfocal, MERINO x 4  Respiratory: CTA B/L, normal respiratory effort, no wheezes, crackles, rales  CV: RRR, S1S2, no murmurs, rubs or gallops  Abdominal: Soft, NT, ND +BS, Last BM  Extremities: No edema, + peripheral pulses

## 2021-03-08 NOTE — PROGRESS NOTE ADULT - ATTENDING COMMENTS
Patient seen and evaluated at bedside. Patient reports no acute concerns at this time. Continue to monitor supplemental oxygen needs. Wean as tolerated.
Patient seen and evaluated at bedside. Patient was ambulating on room air this morning but required oxygen by nasal cannula at 2-3L/min due to SaO2 dropping below 87%. Recovered to 92% with supplemental oxygen. Continue inpatient PT/OT. Plan for discharge to home with supplemental oxygen. Continue to monitor condition.
Patient seen and examined independently earlier today. Case discussed with housestaff, nursing, case mgmt, [facility] physician. Chart, radiology, labs, etc personally reviewed. I agree with most of the resident's note, physical exam, and plan except as below. Patient feels better. Less SOB. No other complaints. Denies CP, AP. Remainder ROS unremarkable.    Gen: NAD, AA0x3   HEENT: EOMI, no LAD  CV: nl S1 S2  Resp: decreased BS b/l  GI: NT/ND/S +BS  MS: no c/c/e, +pulses  Neuro: nonfocal, +reflexes    # Acute hypoxic respiratory failure likely secondary to COVID 19 pneumonia      no evidence of RLL pneumonia  - COVID PCR positive 2/27/21  - X ray (2/28/21): No focal consolidation, pleural effusion or pneumothorax.  - Inflammatory markers: d-dimer 2793, procal 0.5,  Ferritin 3177, CRP 3.41  - Infectious disease recs appreciated  - on 3L NC, SpO2 95-98%  - c/w decadron   - c/w Remdesivir D1: 200mg x1, Day 2 - Day 5 100mg IV daily. Monitor Cr/LFTs  - COVID Ab +  - duplex LE extremities negative for DVT.   - c/w Heparin SQ    #?Gram positive rods in blood  -likely contaminant  -f/u repeat BCx    # SIMON vs CKD3 , unknown baseline  - Monitor BMP   - bladder and kidney US( 2/27/21):  Bilateral renal cysts, No hydronephrosis.  - Hold Enalapril for now     # Elevated troponins - 0.02, then negative x2  - Unlikely ACS. Likely due to kidney function   - outpt f/u     # COPD not on home O2 - No evidence of exacerbation   - Only on albuterol at home. He uses it infrequently   - c/w Decadron  - Continue nebs     # Diabetes - BS uncontrolled   - c/w SS,  lantus 18 units, 6 units lispro TID  - May need further adjustment with decadron     # Essential tremor   - Outpatient neurology follow up     GI PPX: Pantoprazole   DVT PPX: Heparin SQ  DIET: DASH,carb, renal  ACTIVITY:  Advanced as tolerated   Code status: Full code    Dispo: still req O2 and PT. Pt and spouse agree to [facility].    #Progress Note Handoff  Pending (specify):  Consults____Clinical improvement and stability__x__Tests__BCx___PT_x___  Pt/Family discussion: Pt informed and agrees with the current plan  Disposition: Home___x___SNF_______4A______To be determined__x__    My note supersedes the residents note should a discrepancy arise.    Chart and consultant notes personally reviewed.  Care Discussed with Consultants/Other Providers/ Housestaff [ x] YES [ ] NO   Radiology, labs, old records personally reviewed.      35 minutes spent on total encounter; more than 50% of the visit was spent counseling and/or coordinating care by the attending physician.
Patient seen and examined independently earlier today. Case discussed with housestaff, nursing, case mgmt. Chart, radiology, labs, etc personally reviewed. I agree with most of the resident's note, physical exam, and plan except as below. Patient feels better. Less SOB. No other complaints. Denies CP, AP. Remainder ROS unremarkable.    Gen: NAD, AA0x3, fatigued  HEENT: EOMI, no LAD  CV: nl S1 S2  Resp: decreased BS b/l  GI: NT/ND/S +BS  MS: no c/c/e, +pulses  Neuro: nonfocal, +reflexes    tele: nonspecific changes    # Acute hypoxic respiratory failure likely secondary to COVID 19 pneumonia      no evidence of RLL pneumonia  - COVID PCR positive 2/27/21  - X ray (2/28/21): No focal consolidation, pleural effusion or pneumothorax.  - Inflammatory markers: d-dimer 2793, procal 0.5,  Ferritin 3177, CRP 3.41  - f/u Inflammatory markers  - Infectious disease recs appreciated  - on 3L NC, SpO2 95-98%  - c/w decadron   - c/w Remdesivir D1: 200mg x1, Day 2 - Day 5 100mg IV daily. Monitor Cr/LFTs  - f/u COVID Ab   - duplex LE extremities negative for DVT.   - c/w Heparin SQ  - d/c cefepime as no evidence of Pneumonia    #?Gram positive rods in blood  -likely contaminant  -repeat BCx      # SIMON vs CKD3 , unknown baseline  - c/w LR  - Monitor BMP   - bladder and kidney US( 2/27/21):  Bilateral renal cysts, No hydronephrosis.  - Hold Enalapril for now     # Elevated troponins - 0.02, then negative x2  - Unlikely ACS. Likely due to kidney function   - TTE     # COPD not on home O2 - No evidence of exacerbation   - Only on albuterol at home. He uses it infrequently   - c/w Decadron  - Continue nebs     # Diabetes - BS uncontrolled   - c/w SS,  lantus 18 units, 6 units lispro TID  - May need further adjustment with decadron     # Essential tremor   - Outpatient neurology follow up     GI PPX: Pantoprazole   DVT PPX: Heparin SQ  DIET: DASH,carb, renal  ACTIVITY:  Advanced as tolerated   Code status: Full code      #Progress Note Handoff  Pending (specify):  Consults____Clinical improvement and stability__x__Tests__BCx___PT_x___  Pt/Family discussion: Pt informed and agrees with the current plan  Disposition: Home______SNF_______4A______To be determined__x__    My note supersedes the residents note should a discrepancy arise.    Chart and consultant notes personally reviewed.  Care Discussed with Consultants/Other Providers/ Housestaff [ x] YES [ ] NO   Radiology, labs, old records personally reviewed.
70 yr old male seen this morning in no acute distress with above resident. Patient stable on 2L NC although has been walking without oxygen well for a bit. Otherwise ROS was negative. Exam benign. Patient can be discharged home on xarelto 10mg daily for 30 days. Return precautions given. Patient to follow with PCP within 1-2 weeks of discharge. Patient seen and examined with above provider. Agree with history, physical and assessment except where edited and annotated.

## 2021-03-08 NOTE — PROGRESS NOTE ADULT - ASSESSMENT
Patient is a 70y old Male who presents with a chief complaint of  confusion and hypoxia. Currently admitted to medicine with the primary diagnosis of COVID-19.    # Acute hypoxic respiratory failure likely secondary to COVID 19 pneumonia   # COPD not on home O2 - No evidence of exacerbation      no evidence of RLL pneumonia, unlikely PE (Unable to get CTPE given creatinine)  - c/w decadron (started 2/27)  - c/w Remdesivir D1: 200mg x1, Day 2 - Day 5 100mg IV daily. Monitor Cr/LFTs (2/28-3/4)  - 95%, on 2L NC, continue to wean  - C/w PT     # SIMON vs CKD , unknown baseline  - Monitor BMP   - Hold Enalapril for now   - renal dosing of medications    # Diabetes - BS uncontrolled   - c/w SS,  lantus 18 units, 6 units lispro TID    # Essential tremor   - Outpatient neurology follow up     GI PPX: Pantoprazole   DVT PPX: Heparin SQ  DIET: DASH,carb, renal  ACTIVITY:  Advanced as tolerated   Code status: Full code    Dispo: Home with O2. start O2 home delivery script done. Discharge today.

## 2021-03-08 NOTE — PROGRESS NOTE ADULT - PROVIDER SPECIALTY LIST ADULT
Infectious Disease
Internal Medicine
Hospitalist
Hospitalist
Internal Medicine
Internal Medicine
Hospitalist

## 2021-03-08 NOTE — DISCHARGE NOTE NURSING/CASE MANAGEMENT/SOCIAL WORK - PATIENT PORTAL LINK FT
You can access the FollowMyHealth Patient Portal offered by [facility] by registering at the following website: [email or web address]. By joining [employer]’s FollowMyHealth portal, you will also be able to view your health information using other applications (apps) compatible with our system.

## 2021-03-09 ENCOUNTER — TRANSCRIPTION ENCOUNTER (OUTPATIENT)
Age: 71
End: 2021-03-09

## 2021-03-10 ENCOUNTER — TRANSCRIPTION ENCOUNTER (OUTPATIENT)
Age: 71
End: 2021-03-10

## 2021-03-11 ENCOUNTER — TRANSCRIPTION ENCOUNTER (OUTPATIENT)
Age: 71
End: 2021-03-11

## 2021-03-16 LAB
CULTURE RESULTS: SIGNIFICANT CHANGE UP
SPECIMEN SOURCE: SIGNIFICANT CHANGE UP

## 2021-03-18 NOTE — CDI QUERY NOTE - NSCDIOTHERTXTBX_GEN_ALL_CORE_HH
______________________________________________________________________________________________________________________________________    Clinical Indicators:   ED V/S Flowsheet: 2021:  T=101.9, HR=160  Labs:   Procalcitonin = 0.50,  CRP= 3.41,  Creatinine Ibajvk=133    H/P: Attendin2021: # Acute hypoxic respiratory failure likely secondary to COVID 19 pneumonia,  Sepsis present on admission    ID: 2021: #Sepsis on admission T<96.8F, T>101F, Pulse>90, Resp Rate>20, lactic acidosis, metabolic encephalopathy, SIMON due to COVID.  R/O bacterial infection.  Initial procalcitonin borderline,   On cefepime      #Lactic acidosis   #SIMON      PN: Attendin2021: A/P:  Covid-19 sepsis with PNA, acute hypoxic resp failure and SIMON along with acute metabolic encephalopathy - f/up ID recommendations.  PN: Attending: 3/8/2021: # Acute hypoxic respiratory failure likely secondary to COVID 19 pneumonia                  Meds: Cefepime (2021 – 3/1/2021),  RDV (2021 – 3/4/2021), dexamethasone  (2021  - 3/8/2021)     Based on your professional judgment and clinical indicators, can sepsis be further clarified as :     [ ] Sepsis ruled in  [ ] Sepsis ruled out  [ ] Other (please specify)  [ ] Clinically unable to determine    Thank you,

## 2021-03-19 NOTE — CHART NOTE - NSCHARTNOTEFT_GEN_A_CORE
Based on my professional judgment and the patient's clinical indicators sepsis is ruled in. Patient was treated for sepsis and clinical improvement was seen on discharge.
Event Note family notification     Spoke with patients wife Billie. Updated her on patients condition as well as answer any questions or concerns that she may have had.
Patient transferred to [facility] on 3/3/21 - patient seen at beside, resting comfortably and breathing comfortably on 5L NC.
Spoke to spouse Billie.  O2 did not arrive.  d/c home pending arrival of O2.
Spoke with Spouse Billie, updated on patient status and answered all questions, Home O2 not delivered yet.
I left message to patient's wife Billie
IF O2 SAT <88% AT REST: Patient's O2 sat is 88% on room air at rest. Patient has been diagnosed with hypoxia and will therefore need home O2. Patient is aware that he/she will be going home on oxygen. Patient was tested in a chronic stable state.
Spoke to wife Billie ([phone]), updated her and told her that patient will be discharged today. Will need transport to home. Wife will be expecting him home.
Spoke with pts wife Billie. Updated on pts status, answered all questions. She is ok with pt being discharged with home o2. Encouraged to call back if new questions arise.

## 2021-03-19 NOTE — CHART NOTE - NSCHARTNOTESELECT_GEN_ALL_CORE
Event Note
Family Contact
OXygen
family notification
Addendum
Family Note/Event Note
Family Notification
Transfer Note
family/Event Note

## 2021-03-22 DIAGNOSIS — F17.200 NICOTINE DEPENDENCE, UNSPECIFIED, UNCOMPLICATED: ICD-10-CM

## 2021-03-22 DIAGNOSIS — E11.65 TYPE 2 DIABETES MELLITUS WITH HYPERGLYCEMIA: ICD-10-CM

## 2021-03-22 DIAGNOSIS — R79.89 OTHER SPECIFIED ABNORMAL FINDINGS OF BLOOD CHEMISTRY: ICD-10-CM

## 2021-03-22 DIAGNOSIS — Z88.2 ALLERGY STATUS TO SULFONAMIDES: ICD-10-CM

## 2021-03-22 DIAGNOSIS — R07.9 CHEST PAIN, UNSPECIFIED: ICD-10-CM

## 2021-03-22 DIAGNOSIS — J96.01 ACUTE RESPIRATORY FAILURE WITH HYPOXIA: ICD-10-CM

## 2021-03-22 DIAGNOSIS — J15.9 UNSPECIFIED BACTERIAL PNEUMONIA: ICD-10-CM

## 2021-03-22 DIAGNOSIS — Q61.02 CONGENITAL MULTIPLE RENAL CYSTS: ICD-10-CM

## 2021-03-22 DIAGNOSIS — R94.31 ABNORMAL ELECTROCARDIOGRAM [ECG] [EKG]: ICD-10-CM

## 2021-03-22 DIAGNOSIS — R74.01 ELEVATION OF LEVELS OF LIVER TRANSAMINASE LEVELS: ICD-10-CM

## 2021-03-22 DIAGNOSIS — U07.1 COVID-19: ICD-10-CM

## 2021-03-22 DIAGNOSIS — G25.0 ESSENTIAL TREMOR: ICD-10-CM

## 2021-03-22 DIAGNOSIS — E87.2 ACIDOSIS: ICD-10-CM

## 2021-03-22 DIAGNOSIS — Z91.81 HISTORY OF FALLING: ICD-10-CM

## 2021-03-22 DIAGNOSIS — A41.89 OTHER SPECIFIED SEPSIS: ICD-10-CM

## 2021-03-22 DIAGNOSIS — R00.0 TACHYCARDIA, UNSPECIFIED: ICD-10-CM

## 2021-03-22 DIAGNOSIS — Z79.84 LONG TERM (CURRENT) USE OF ORAL HYPOGLYCEMIC DRUGS: ICD-10-CM

## 2021-03-22 DIAGNOSIS — I95.9 HYPOTENSION, UNSPECIFIED: ICD-10-CM

## 2021-03-22 DIAGNOSIS — E11.22 TYPE 2 DIABETES MELLITUS WITH DIABETIC CHRONIC KIDNEY DISEASE: ICD-10-CM

## 2021-03-22 DIAGNOSIS — N18.30 CHRONIC KIDNEY DISEASE, STAGE 3 UNSPECIFIED: ICD-10-CM

## 2021-03-22 DIAGNOSIS — J44.0 CHRONIC OBSTRUCTIVE PULMONARY DISEASE WITH (ACUTE) LOWER RESPIRATORY INFECTION: ICD-10-CM

## 2021-03-22 DIAGNOSIS — J12.82 PNEUMONIA DUE TO CORONAVIRUS DISEASE 2019: ICD-10-CM

## 2021-03-22 DIAGNOSIS — G93.41 METABOLIC ENCEPHALOPATHY: ICD-10-CM

## 2021-05-08 ENCOUNTER — EMERGENCY (EMERGENCY)
Facility: HOSPITAL | Age: 71
LOS: 0 days | Discharge: HOME | End: 2021-05-08
Attending: EMERGENCY MEDICINE | Admitting: EMERGENCY MEDICINE
Payer: MEDICARE

## 2021-05-08 VITALS
SYSTOLIC BLOOD PRESSURE: 152 MMHG | WEIGHT: 195.99 LBS | HEIGHT: 73 IN | TEMPERATURE: 97 F | HEART RATE: 86 BPM | RESPIRATION RATE: 16 BRPM | OXYGEN SATURATION: 99 % | DIASTOLIC BLOOD PRESSURE: 81 MMHG

## 2021-05-08 DIAGNOSIS — E11.51 TYPE 2 DIABETES MELLITUS WITH DIABETIC PERIPHERAL ANGIOPATHY WITHOUT GANGRENE: ICD-10-CM

## 2021-05-08 DIAGNOSIS — Z79.84 LONG TERM (CURRENT) USE OF ORAL HYPOGLYCEMIC DRUGS: ICD-10-CM

## 2021-05-08 DIAGNOSIS — I10 ESSENTIAL (PRIMARY) HYPERTENSION: ICD-10-CM

## 2021-05-08 DIAGNOSIS — J44.9 CHRONIC OBSTRUCTIVE PULMONARY DISEASE, UNSPECIFIED: ICD-10-CM

## 2021-05-08 DIAGNOSIS — Z88.2 ALLERGY STATUS TO SULFONAMIDES: ICD-10-CM

## 2021-05-08 DIAGNOSIS — F17.200 NICOTINE DEPENDENCE, UNSPECIFIED, UNCOMPLICATED: ICD-10-CM

## 2021-05-08 DIAGNOSIS — I73.9 PERIPHERAL VASCULAR DISEASE, UNSPECIFIED: ICD-10-CM

## 2021-05-08 DIAGNOSIS — R25.2 CRAMP AND SPASM: ICD-10-CM

## 2021-05-08 PROCEDURE — 99284 EMERGENCY DEPT VISIT MOD MDM: CPT

## 2021-05-08 NOTE — ED PROVIDER NOTE - PATIENT PORTAL LINK FT
You can access the FollowMyHealth Patient Portal offered by [facility] by registering at the following website: [email or web address]. By joining [employer]’s FollowMyHealth portal, you will also be able to view your health information using other applications (apps) compatible with our system.

## 2021-05-08 NOTE — ED PROVIDER NOTE - CARE PROVIDER_API CALL
Anderson Calabrese)  Surgery; Vascular Surgery  [address] [place] [address]  Phone: [phone]  Fax: [phone]  Follow Up Time: 7-10 Days

## 2021-05-08 NOTE — ED PROVIDER NOTE - OBJECTIVE STATEMENT
71 yo male hx of COPD/HTN/DM/smoker present c/o left lower leg cramp for few days and decreased pulses to his left foot. reported he was seen by his podiatrist earlier today for toenail clipping and was told his pulse was mixing from his left foot. denies numbness to his left foot/toe. denies pain on ambulation to his leg. Cramp of left leg comes off/on. denies cramp in ED now. denies fever/chill/redness or wound to his left foot.

## 2021-05-08 NOTE — ED PROVIDER NOTE - PHYSICAL EXAMINATION
CONSTITUTIONAL: Well-appearing; well-nourished; in no apparent distress.   CARDIOVASCULAR: Normal S1, S2; no murmurs, rubs, or gallops.   RESPIRATORY: Normal chest excursion with respiration; breath sounds clear and equal bilaterally; no wheezes, rhonchi, or rales.  MS: b/l feet with equal temp and nml color. cap refill < 2sec to all toes. no bony tenderness. sensation and strength equal b/l. Faint right DP pulse, 1+ right PT pulse left DP detectable with doppler but faint, left PT pulses 1+ and detectable with doppler.  SKIN: no wound/erythema noted to b/l feet   NEURO/PSYCH: A & O x 4; grossly unremarkable.

## 2021-05-08 NOTE — ED PROVIDER NOTE - NSFOLLOWUPINSTRUCTIONS_ED_ALL_ED_FT
Peripheral Vascular Disease     Peripheral vascular disease (PVD) is a disease of the blood vessels. PVD may also be called peripheral artery disease (PAD) or poor circulation. In most cases, PVD narrows the blood vessels that carry blood from the heart to the rest of the body. This can result in a decreased supply of blood to the arms, legs, and internal organs, such as the stomach or kidneys. However, PVD most often affects a person's lower legs and feet. Without treatment, PVD tends to get worse over time.    PVD can lead to acute limb ischemia. This occurs when an arm or leg suddenly has trouble getting enough blood. This is a medical emergency.    What are the causes?    Each type of PVD has many different causes. The most common cause is atherosclerosis. This is a buildup of fatty material and other substances (plaque)inside your arteries. Small amounts of plaque can break off from the walls of an artery and become lodged in a smaller artery. This blocks blood flow and can cause acute limb ischemia.  Other common causes of PVD include:  •Blood clots that form inside the blood vessels.  •Injuries to blood vessels.  •Diseases that cause inflammation of blood vessels or cause blood vessel tightening (spasms).    What increases the risk?  The following factors may make you more likely to develop this condition:  •A family history of PVD.  •Common medical conditions, including:  •High cholesterol.  •Diabetes.  •High blood pressure (hypertension).  •Heart disease.  •Past problems with blood clots.  •Past injury, such as burns or a broken bone.    •Other medical conditions, such as:  •Buerger's disease. This is caused by inflamed blood vessels in your hands and feet.  •Some forms of arthritis.  •Birth defects that affect the arteries in your legs.  •Kidney disease.  •Using tobacco and nicotine products.  •Not getting enough exercise.  •Obesity.  •Being 50 years old or older.    What are the signs or symptoms?  This condition may cause different symptoms. Your symptoms depend on what part of your body is not getting enough blood. Some common signs and symptoms include:  •Cramps in your lower legs.  •Pain and weakness in your legs when you are physically active that goes away when you rest.  •Leg pain when at rest.  •Leg numbness, tingling, or weakness.  •Coldness in a leg or foot, especially when compared with the other leg or foot.  •Skin or hair changes. These can include:  •Hair loss.  •Shiny skin.  •Pale or bluish skin.  •Thick toenails.  •Inability to get or maintain an erection (erectile dysfunction).  •Tiredness (fatigue).    People with PVD are more likely to develop open wounds (ulcers) and sores on their toes, feet, or legs. The ulcers or sores may take longer than normal to heal.    How is this diagnosed?  PVD is diagnosed based on your signs and symptoms, a physical exam, and your medical history. You may also have tests to find out the cause of your PVD and how severe it is. Tests may include:  •Recordings of the blood pressure in your arms and legs and measurements of your pulse strength (pulse volume recordings).    •Imaging studies using sound waves to take pictures of the blood flow through your blood vessels (Doppler ultrasound).    •Imaging studies in which dye is injected into your blood vessels and images are taken of the blood flow. These include:  •X-rays (angiogram or arteriogram).  •Computer-generated X-rays (CT angiogram).  •A powerful electromagnetic field and a computer (magnetic resonance angiogram, or MRA).    How is this treated?  Treatment for PVD depends on the cause of your condition, how severe your symptoms are, and your age. Underlying causes need to be treated and controlled. These include long-term (chronic) conditions, such as diabetes, high cholesterol, and hypertension. Treatment may include:•Lifestyle changes, such as:  •Quitting tobacco use.  •Exercising regularly.  •Following a low-fat, low-cholesterol diet.  •Not drinking alcohol.  •Taking medicines, such as:  •Blood thinners to prevent blood clots.  •Medicines to improve blood flow.  •Medicines to improve your blood cholesterol levels.      •Procedures, such as:  •Angioplasty. This uses an inflated balloon to open a blocked artery and improve blood flow.    •Stent implant. This inserts a small mesh tube to keep a blocked artery open.    •Peripheral bypass surgery. This reroutes blood flow around a blocked artery.    •Surgery to remove dead tissue from an infected wound on the affected limb.    •Amputation. This is surgical removal of the affected limb. It may be necessary in cases of acute limb ischemia when medical or surgical treatments have not helped.    Follow these instructions at home:    Medicines     •Take over-the-counter and prescription medicines only as told by your health care provider.    •If you are taking blood thinners:  •Talk with your health care provider before you take any medicines that contain aspirin or NSAIDs, such as ibuprofen. These medicines increase your risk for dangerous bleeding.  •Take your medicine exactly as told, at the same time every day.  •Avoid activities that could cause injury or bruising, and follow instructions about how to prevent falls.  •Wear a medical alert bracelet or carry a card that lists what medicines you take.  • Do not use any products that contain nicotine or tobacco, such as cigarettes, e-cigarettes, and chewing tobacco. If you need help quitting, ask your health care provider.  •Talk with your health care provider about maintaining a healthy weight. If needed, ask about losing weight.  •Eat a diet that is low in fat and cholesterol. If you need help, talk with your health care provider.    • Do not drink alcohol.    •Exercise regularly. Ask your health care provider about some good activities for you.      General instructions   •Take good care of your feet. To do this:  •Wear comfortable shoes that fit well.  •Check your feet often for any cuts or sores.  •Keep all follow-up visits as told by your health care provider. This is important.    Where to find more information  •Society for Vascular Surgery: vascular.org  •American Heart Association: heart.org  •National Heart, Lung, and Blood [place]: nhlbi.nih.gov    Contact a health care provider if:  •You have leg cramps while walking.  •You have leg pain when you rest.  •Your leg or foot feels cold.  •Your skin changes.  •You have erectile dysfunction.  •You have cuts or sores on your feet that do not heal.      Get help right away if:  •You have sudden changes in color and feeling of your arms or legs, such as:  •Your arm or leg turns cold, numb, and blue.  •Your arm or leg becomes red, warm, swollen, painful, or numb.  •You have any symptoms of a stroke. "BE FAST" is an easy way to remember the main warning signs of a stroke:  •B - Balance. Signs are dizziness, sudden trouble walking, or loss of balance.  •E - Eyes. Signs are trouble seeing or a sudden change in vision.  •F - Face. Signs are sudden weakness or numbness of the face, or the face or eyelid drooping on one side.  •A - Arms. Signs are weakness or numbness in an arm. This happens suddenly and usually on one side of the body.  •S - Speech. Signs are sudden trouble speaking, slurred speech, or trouble understanding what people say.  •T - Time. Time to call emergency services. Write down what time symptoms started.      •You have other signs of a stroke, such as:  •A sudden, severe headache with no known cause.  •Nausea or vomiting.  •Seizure.  •You have chest pain or trouble breathing.      These symptoms may represent a serious problem that is an emergency. Do not wait to see if the symptoms will go away. Get medical help right away. Call your local emergency services (911 in the [place] Do not drive yourself to the hospital.     Summary  •Peripheral vascular disease (PVD) is a disease of the blood vessels.  •In most cases, PVD narrows the blood vessels that carry blood from your heart to the rest of your body.  •PVD may cause different symptoms. Your symptoms depend on what part of your body is not getting enough blood.  •Treatment for PVD depends on what caused it, how severe your symptoms are, and your age.      This information is not intended to replace advice given to you by your health care provider. Make sure you discuss any questions you have with your health care provider.

## 2021-05-08 NOTE — ED ADULT NURSE NOTE - CHIEF COMPLAINT
The patient is a 70y Male complaining of cramping and numbness on the left lower leg and foot for 2 weeks.

## 2021-05-08 NOTE — ED PROVIDER NOTE - PROGRESS NOTE DETAILS
patient is high risk for vascular disease. currently no wound to wound. circulation is still present to b/l feet. patient was advised to f/u vascular as outpatient without 1-2 weeks. encourage return to ED immediate if discolorations/ ulcer/ numbness to his feet or any concerning sxs.

## 2021-05-08 NOTE — ED PROVIDER NOTE - NS ED ROS FT
Constitutional: no fever, chills, no recent weight loss, change in appetite or malaise  MS: see HPI  Neuro: No headache or weakness. No LOC.  Skin: No skin rash.  Endocrine: +diabetes.

## 2021-05-08 NOTE — ED PROVIDER NOTE - ATTENDING CONTRIBUTION TO CARE
I personally evaluated the patient. I reviewed the Resident’s or Physician Assistant’s note (as assigned above), and agree with the findings and plan except as documented in my note.  Chart reviewed.  H/O DM, presents with leg cramps when standing, unrelated to exertion or rest.  Exam shows clear lungs, RR S1S2, abdomen soft Nt +BS, warm legs and feet, good distal pulses, cap refill 1 sec.

## 2021-05-09 PROBLEM — J44.9 CHRONIC OBSTRUCTIVE PULMONARY DISEASE, UNSPECIFIED: Chronic | Status: ACTIVE | Noted: 2021-02-27

## 2021-05-09 PROBLEM — E11.9 TYPE 2 DIABETES MELLITUS WITHOUT COMPLICATIONS: Chronic | Status: ACTIVE | Noted: 2021-02-27

## 2021-05-09 PROBLEM — I10 ESSENTIAL (PRIMARY) HYPERTENSION: Chronic | Status: ACTIVE | Noted: 2021-02-27

## 2021-05-13 PROBLEM — Z00.00 ENCOUNTER FOR PREVENTIVE HEALTH EXAMINATION: Status: ACTIVE | Noted: 2021-05-13

## 2021-05-20 ENCOUNTER — APPOINTMENT (OUTPATIENT)
Dept: VASCULAR SURGERY | Facility: CLINIC | Age: 71
End: 2021-05-20

## 2022-04-13 ENCOUNTER — APPOINTMENT (OUTPATIENT)
Dept: VASCULAR SURGERY | Facility: CLINIC | Age: 72
End: 2022-04-13

## 2022-05-04 ENCOUNTER — APPOINTMENT (OUTPATIENT)
Dept: VASCULAR SURGERY | Facility: CLINIC | Age: 72
End: 2022-05-04
Payer: MEDICARE

## 2022-05-04 VITALS
WEIGHT: 197 LBS | SYSTOLIC BLOOD PRESSURE: 149 MMHG | BODY MASS INDEX: 25.28 KG/M2 | HEIGHT: 74 IN | DIASTOLIC BLOOD PRESSURE: 89 MMHG

## 2022-05-04 DIAGNOSIS — R42 DIZZINESS AND GIDDINESS: ICD-10-CM

## 2022-05-04 DIAGNOSIS — I65.23 OCCLUSION AND STENOSIS OF BILATERAL CAROTID ARTERIES: ICD-10-CM

## 2022-05-04 DIAGNOSIS — E78.00 PURE HYPERCHOLESTEROLEMIA, UNSPECIFIED: ICD-10-CM

## 2022-05-04 DIAGNOSIS — Z86.79 PERSONAL HISTORY OF OTHER DISEASES OF THE CIRCULATORY SYSTEM: ICD-10-CM

## 2022-05-04 DIAGNOSIS — Z78.9 OTHER SPECIFIED HEALTH STATUS: ICD-10-CM

## 2022-05-04 DIAGNOSIS — Z86.39 PERSONAL HISTORY OF OTHER ENDOCRINE, NUTRITIONAL AND METABOLIC DISEASE: ICD-10-CM

## 2022-05-04 DIAGNOSIS — Z87.891 PERSONAL HISTORY OF NICOTINE DEPENDENCE: ICD-10-CM

## 2022-05-04 DIAGNOSIS — G45.9 TRANSIENT CEREBRAL ISCHEMIC ATTACK, UNSPECIFIED: ICD-10-CM

## 2022-05-04 PROCEDURE — 93880 EXTRACRANIAL BILAT STUDY: CPT

## 2022-05-04 PROCEDURE — 99204 OFFICE O/P NEW MOD 45 MIN: CPT

## 2022-05-04 RX ORDER — METFORMIN HYDROCHLORIDE 625 MG/1
TABLET ORAL
Refills: 0 | Status: ACTIVE | COMMUNITY

## 2022-05-04 RX ORDER — ENALAPRIL MALEATE 5 MG/1
TABLET ORAL
Refills: 0 | Status: ACTIVE | COMMUNITY

## 2022-05-04 RX ORDER — ATORVASTATIN CALCIUM 80 MG/1
TABLET, FILM COATED ORAL
Refills: 0 | Status: ACTIVE | COMMUNITY

## 2022-05-04 RX ORDER — SITAGLIPTIN 100 MG/1
TABLET, FILM COATED ORAL
Refills: 0 | Status: ACTIVE | COMMUNITY

## 2022-06-01 LAB
BUN SERPL-MCNC: 26 MG/DL
CREAT SERPL-MCNC: 2 MG/DL
EGFR: 35 ML/MIN/1.73M2

## 2022-06-01 NOTE — HISTORY OF PRESENT ILLNESS
[FreeTextEntry1] : The patient is a 71-year-old male who presents for evaluation of carotid stenosis. The patient complains of dizziness he also complains of visual symptoms like a sheet coming down in front of his eye which lasts for a few seconds and resolves. The patient states this happens on occasion and to his eyes bilaterally. The patient had an outside carotid duplex that showed some mild-to-moderate internal carotid artery stenosis. Today he presents for evaluation

## 2022-06-01 NOTE — ASSESSMENT
[FreeTextEntry1] : The patient is a 71-year-old gentleman with new onset dizziness and temporary loss of vision in his eyes bilaterally. Patient has questionable TIA events. I performed a carotid duplex that shows no focal stenosis of his internal carotid arteries however I would like to send the patient for a CTA of his neck and his brain to further evaluate his intracranial vasculature for disease and a source for his symptoms. In sending him for a BUN and creatinine prior to the study to evaluate his kidney function. I will see him back in the office after the CT is performed.

## 2024-09-30 NOTE — PROGRESS NOTE ADULT - REASON FOR ADMISSION
Hypoxia, confusion
Detail Level: Zone
